# Patient Record
Sex: MALE | Race: WHITE | NOT HISPANIC OR LATINO | Employment: OTHER | URBAN - METROPOLITAN AREA
[De-identification: names, ages, dates, MRNs, and addresses within clinical notes are randomized per-mention and may not be internally consistent; named-entity substitution may affect disease eponyms.]

---

## 2017-10-20 ENCOUNTER — TRANSFERRED RECORDS (OUTPATIENT)
Dept: HEALTH INFORMATION MANAGEMENT | Facility: CLINIC | Age: 72
End: 2017-10-20

## 2017-11-07 ENCOUNTER — PRE VISIT (OUTPATIENT)
Dept: UROLOGY | Facility: CLINIC | Age: 72
End: 2017-11-07

## 2017-11-28 ENCOUNTER — PRE VISIT (OUTPATIENT)
Dept: UROLOGY | Facility: CLINIC | Age: 72
End: 2017-11-28

## 2017-11-28 DIAGNOSIS — N35.919 URETHRAL STRICTURE: Primary | ICD-10-CM

## 2017-11-28 NOTE — TELEPHONE ENCOUNTER
Pt coming in for a urethral stricture consult. He currently has an SP tube. Message sent to schedulers to have RUG/VCUG set up.

## 2017-12-04 ENCOUNTER — OFFICE VISIT (OUTPATIENT)
Dept: UROLOGY | Facility: CLINIC | Age: 72
End: 2017-12-04

## 2017-12-04 VITALS
HEIGHT: 63 IN | HEART RATE: 74 BPM | BODY MASS INDEX: 27.66 KG/M2 | SYSTOLIC BLOOD PRESSURE: 119 MMHG | DIASTOLIC BLOOD PRESSURE: 68 MMHG | WEIGHT: 156.1 LBS

## 2017-12-04 DIAGNOSIS — N35.9 URETHRAL STRICTURE, UNSPECIFIED STRICTURE TYPE: Primary | ICD-10-CM

## 2017-12-04 RX ORDER — ATORVASTATIN CALCIUM 20 MG/1
20 TABLET, FILM COATED ORAL
COMMUNITY
Start: 2017-09-28

## 2017-12-04 RX ORDER — SENNA LEAF EXTRACT 176MG/5ML
15 SYRUP ORAL
COMMUNITY

## 2017-12-04 RX ORDER — OXYBUTYNIN CHLORIDE 10 MG/1
10 TABLET, EXTENDED RELEASE ORAL
COMMUNITY
Start: 2016-12-22

## 2017-12-04 ASSESSMENT — PAIN SCALES - GENERAL: PAINLEVEL: NO PAIN (0)

## 2017-12-04 NOTE — MR AVS SNAPSHOT
After Visit Summary   12/4/2017    Duran Platt    MRN: [id number]           Patient Information     Date Of Birth          1945        Visit Information        Provider Department      12/4/2017 1:30 PM Heriberto Baird MD [facility] and [facility]        Care Instructions    Follow up with Dr. Baird with a cystoscopy.    It was a pleasure meeting with you today.  Thank you for allowing me and my team the privilege of caring for you today.  YOU are the reason we are here, and I truly hope we provided you with the excellent service you deserve.  Please let us know if there is anything else we can do for you so that we can be sure you are leaving completely satisfied with your care experience.              Follow-ups after your visit        Your next 10 appointments already scheduled     Feb 05, 2018  5:30 PM CST   (Arrive by 5:15 PM)   Cystoscopy with Heriberto Baird MD   [facility] and [facility] ([facility] [place])    [address] [place] [address] [phone]              Who to contact     Please call your clinic at [phone] to:    Ask questions about your health    Make or cancel appointments    Discuss your medicines    Learn about your test results    Speak to your doctor   If you have compliments or concerns about an experience at your clinic, or if you wish to file a complaint, please contact [facility] Patient Relations at [phone] or email us at [email or web address]         Additional Information About Your Visit        [employer] Information     [employer] is an electronic gateway that provides easy, online access to your medical records. With [employer], you can request a clinic appointment, read your test results, renew a prescription or communicate with your care team.     To sign up for [employer] visit the website  "at [email or web address]   You will be asked to enter the access code listed below, as well as some personal information. Please follow the directions to create your username and password.     Your access code is: [email or web address]  Expires: 2018  6:31 AM     Your access code will  in 90 days. If you need help or a new code, please contact your [facility] or call [phone] for assistance.        Care EveryWhere ID     This is your Care EveryWhere ID. This could be used by other organizations to access your [place] medical records  [id number]        Your Vitals Were     Pulse Height BMI (Body Mass Index)             74 1.6 m (5' 3\") 27.65 kg/m2          Blood Pressure from Last 3 Encounters:   17 119/68   14 110/70   10/25/12 110/62    Weight from Last 3 Encounters:   17 70.8 kg (156 lb 1.6 oz)   14 73.5 kg (162 lb)   13 70.8 kg (156 lb)              Today, you had the following     No orders found for display         Today's Medication Changes          These changes are accurate as of: 17  2:31 PM.  If you have any questions, ask your nurse or doctor.               Stop taking these medicines if you haven't already. Please contact your care team if you have questions.     carisoprodol 350 MG tablet   Commonly known as:  SOMA   Stopped by:  Heriberto Baird MD           meloxicam 7.5 MG tablet   Commonly known as:  MOBIC   Stopped by:  Heriberto Baird MD           SIMVASTATIN   Stopped by:  Heriberto Baird MD                    Primary Care Provider Office Phone # Fax #    Herberth Montgomery [phone]       [address] [address]        Equal Access to Services     YOHANA VEE : Juan Manuel Lubin, dusty luqtoby, qaelizabeth hutchinsonalaminata stroud. [facility] [phone].    ATENCIÓN: Si louie london " a palacios disposición servicios gratuitos de asistencia lingüística. Jorge milian [phone].    We comply with applicable federal civil rights laws and [place] laws. We do not discriminate on the basis of race, color, national origin, age, disability, sex, sexual orientation, or gender identity.            Thank you!     Thank you for choosing [facility]OLOGY AND [facility] PROST[facility]OLOGIC [facility]  for your care. Our goal is always to provide you with excellent care. Hearing back from our patients is one way we can continue to improve our services. Please take a few minutes to complete the written survey that you may receive in the mail after your visit with us. Thank you!             Your Updated Medication List - Protect others around you: Learn how to safely use, store and throw away your medicines at www.disposemymeds.org.          This list is accurate as of: 12/4/17  2:31 PM.  Always use your most recent med list.                   Brand Name Dispense Instructions for use Diagnosis    ASPIRIN CHILDRENS 81 MG chewable tablet   Generic drug:  aspirin      Take 81 mg by mouth daily.        atorvastatin 20 MG tablet    LIPITOR     Take 20 mg by mouth        Multi-vitamin Tabs tablet      Take 1 tablet by mouth daily.        oxybutynin 10 MG 24 hr tablet    DITROPAN-XL     Take 10 mg by mouth        Senna 176 MG/5ML Syrp      Take 15 mLs by mouth        VITAMIN D-3 PO      Take  by mouth.

## 2017-12-04 NOTE — LETTER
12/4/2017       RE: Duran Platt  [address]  [place] [facility] [place] [address]     Dear Colleague,    Thank you for referring your patient, Duran Platt, to the [facility] UROLOGY AND INST FOR PROSTATE AND UROLOGIC CANCERS at [facility]. Please see a copy of my visit note below.    UROLOGY CONSULT HISTORY & PHYSICAL    Name: Duran Platt    MRN: [id number]   YOB: 1945         CHIEF COMPLAINT:  Urethral stricture    HISTORY OF PRESENT ILLNESS:  Mr. Platt is a 72 year old-year-old man seen in consultation for urethral stricture.  He has previously been seen for this by Dr. Baird at [facility] in October of 2016.  Per clinic note by myself in conjunction with Dr. Kemp recently at [facility] done 4/6/2017, this patient has 'a history of BPH (neg prostate biopsy in 2005 per chart review) and underwent laser TURP at [facility] in 2013. Following this procedure he continued to have problems with decreased urine flow, and he needed an indwelling catheter for a short time, and also was self-cathing for a couple of weeks. 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 Initially he had been feeling well with his urination, but then began experiencing worsening urinary symptoms, and was found to have developed an anterior urethral stricture after his TURP. He has since had an SP tube placed in the OR on 8/23/16 for a long segment urethral stricture.     Since the placement of the SP tube he has been evaluated with Dr. Baird for consideration of reconstructive surgery, but Duran is really just leaning toward having another dilation, and going on the self cathing regimen as this did not bother him much when he was on this in the past. He is also a little concerned about the difficulty in getting his follow up with   Oli, and is happy to just follow up with me.'  Also endorses recent bladder spasms (which previously have occurred with catheter changes and recent VCUG) that have been increasing in frequency. Mentions that these happen early in the morning when he wakes up as well as later in the day. Notes that his catheter is still draining well and that he does not have leakage around the catheter. Denies recent fevers.  He mentions...that he would be interested in reconstruction, but that residual scarring/stricturing is 'a foregone conclusion' based on his outcomes after his previous surgeries.'    Today, his goal is to be free of his suprapubic tube and he notes that he would be interested in reconstruction if this is possible.  Other medical issues include ROBBIE (CPAP @ home).  No known cardiac disease.  Takes a low dose aspirin but no other blood thinners.    REVIEW OF QUESTIONNAIRES:  Questionnaires reviewed. See flowsheet for details.    Past Medical History:   Diagnosis Date     BPH (benign prostatic hyperplasia)        Past Surgical History:   Procedure Laterality Date     APPENDECTOMY       GENITOURINARY SURGERY      prastate bladder neck surgery 1/25/12     ORTHOPEDIC SURGERY      r rotator cuff repair       Current Outpatient Prescriptions   Medication     atorvastatin (LIPITOR) 20 MG tablet     oxybutynin (DITROPAN-XL) 10 MG 24 hr tablet     Senna 176 MG/5ML SYRP     multivitamin, therapeutic with minerals (MULTI-VITAMIN) TABS     Cholecalciferol (VITAMIN D-3 PO)     aspirin (ASPIRIN CHILDRENS) 81 MG chewable tablet     No current facility-administered medications for this visit.        Allergies as of 12/04/2017 - Heber as Reviewed 12/04/2017   Allergen Reaction Noted     Tetracycline  02/13/2009     Tetracyclines Photosensitivity 06/28/2006     Omeprazole Rash 03/21/2012     Omeprazole magnesium Rash 01/27/2012       No family history on file.    Social History     Social History     Marital status:       "Spouse name: N/A     Number of children: N/A     Years of education: N/A     Occupational History     Not on file.     Social History Main Topics     Smoking status: Never Smoker     Smokeless tobacco: Not on file     Alcohol use No     Drug use: No     Sexual activity: Not on file     Other Topics Concern     Not on file     Social History Narrative       REVIEW OF SYSTEMS:   See HPI for pertinent details.  Remainder of 10-point ROS negative.    PHYSICAL EXAM:  General: Well-dressed, well-nourished man in no acute distress  Vitals: /68  Pulse 74  Ht 1.6 m (5' 3\")  Wt 70.8 kg (156 lb 1.6 oz)  BMI 27.65 kg/m2 Estimated body mass index is 27.65 kg/(m^2) as calculated from the following:    Height as of this encounter: 1.6 m (5' 3\").    Weight as of this encounter: 70.8 kg (156 lb 1.6 oz).  Eyes: Anicteric, EOMI  Lungs: No respiratory distress  Heart: Regular rate and rhythm  Abdomen: not obese, soft, nontender, without masses.  Suprapubic tube in place draining randolph urine  Back: Flanks are nontender  : Is circumcised. Meatal stenosis is absent, penile plaques are absent. Skin lesions are absent.  There is not firmness along the course of the entire urethra urethra.  Testes are 10 mL bilaterally without masses. Rectal examination was not performed  Neurologic: Grossly nonfocal    REVIEW OF IMAGING STUDIES:  Retrograde urethrogram and voiding cystourethrogram were ordered but not performed today    RUG/VCUG 3/17/2017 - Reviewed  Impression:   1. Long segment urethral stricture measuring approximately 9 cm from the mid shaft to the membranous urethra, completely obliterated.   2. Bilateral ureteral reflux.     ASSESSMENT/PLAN:  A 72 year old-year-old gentleman with urethral stricture refractory to minimally invasive management.  He has been managed by prior urologist(s) and is referred to our center for advanced treatment options.    The RUG/VCUG dated March 2017 shows significant progression of the length " of the urethral stricture compared to imaging we had done in October 2016. The patient states that he had significant pain during the March exam and that perhaps the quality of the exam was affected by this. Given the side like to proceed with cystoscopy in the office to confirm the extent of disease. We will plan to place a flexible cystoscope through the suprapubic cystostomy tract and then a pediatric flexible scope through the penis if the adult flexible scope passed through the penis. If we find only among membranous stricture and then he could be a good candidate for a ventral onlay buccal mucosa graft. If there is more extensive disease then he might be a candidate for perineal urethrostomy if there is enough proximal urethra.     Patient was seen and examined with Dr. Baird    --    Rohit Montgomery MD  Urology Resident  pgr: [phone]    1:31 PM, 12/4/2017    =======================================================  As the attending surgeon I, Heriberto Baird, interviewed and examined the patient. The plan was developed between me and the patient. My findings and plan are as stated by the resident.    Heriberto Baird MD

## 2017-12-04 NOTE — PATIENT INSTRUCTIONS
Follow up with Dr. Baird with a cystoscopy.    It was a pleasure meeting with you today.  Thank you for allowing me and my team the privilege of caring for you today.  YOU are the reason we are here, and I truly hope we provided you with the excellent service you deserve.  Please let us know if there is anything else we can do for you so that we can be sure you are leaving completely satisfied with your care experience.

## 2017-12-04 NOTE — PROGRESS NOTES
UROLOGY CONSULT HISTORY & PHYSICAL    Name: Duran Platt    MRN: [id number]   YOB: 1945         CHIEF COMPLAINT:  Urethral stricture    HISTORY OF PRESENT ILLNESS:  Mr. Platt is a 72 year old-year-old man seen in consultation for urethral stricture.  He has previously been seen for this by Dr. Baird at [facility] in October of 2016.  Per clinic note by myself in conjunction with Dr. Kemp recently at [facility] done 4/6/2017, this patient has 'a history of BPH (neg prostate biopsy in 2005 per chart review) and underwent laser TURP at [facility] in 2013. Following this procedure he continued to have problems with decreased urine flow, and he needed an indwelling catheter for a short time, and also was self-cathing for a couple of weeks. 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 Initially he had been feeling well with his urination, but then began experiencing worsening urinary symptoms, and was found to have developed an anterior urethral stricture after his TURP. He has since had an SP tube placed in the OR on 8/23/16 for a long segment urethral stricture.     Since the placement of the SP tube he has been evaluated with Dr. Baird for consideration of reconstructive surgery, but Duran is really just leaning toward having another dilation, and going on the self cathing regimen as this did not bother him much when he was on this in the past. He is also a little concerned about the difficulty in getting his follow up with Dr. Baird, and is happy to just follow up with me.'  Also endorses recent bladder spasms (which previously have occurred with catheter changes and recent VCUG) that have been increasing in frequency. Mentions that these happen early in the morning when he wakes up as well as later in the day. Notes that his catheter is  still draining well and that he does not have leakage around the catheter. Denies recent fevers.  He mentions...that he would be interested in reconstruction, but that residual scarring/stricturing is 'a foregone conclusion' based on his outcomes after his previous surgeries.'    Today, his goal is to be free of his suprapubic tube and he notes that he would be interested in reconstruction if this is possible.  Other medical issues include ROBBIE (CPAP @ home).  No known cardiac disease.  Takes a low dose aspirin but no other blood thinners.    REVIEW OF QUESTIONNAIRES:  Questionnaires reviewed. See flowsheet for details.    Past Medical History:   Diagnosis Date     BPH (benign prostatic hyperplasia)        Past Surgical History:   Procedure Laterality Date     APPENDECTOMY       GENITOURINARY SURGERY      prastate bladder neck surgery 1/25/12     ORTHOPEDIC SURGERY      r rotator cuff repair       Current Outpatient Prescriptions   Medication     atorvastatin (LIPITOR) 20 MG tablet     oxybutynin (DITROPAN-XL) 10 MG 24 hr tablet     Senna 176 MG/5ML SYRP     multivitamin, therapeutic with minerals (MULTI-VITAMIN) TABS     Cholecalciferol (VITAMIN D-3 PO)     aspirin (ASPIRIN CHILDRENS) 81 MG chewable tablet     No current facility-administered medications for this visit.        Allergies as of 12/04/2017 - Heber as Reviewed 12/04/2017   Allergen Reaction Noted     Tetracycline  02/13/2009     Tetracyclines Photosensitivity 06/28/2006     Omeprazole Rash 03/21/2012     Omeprazole magnesium Rash 01/27/2012       No family history on file.    Social History     Social History     Marital status:      Spouse name: N/A     Number of children: N/A     Years of education: N/A     Occupational History     Not on file.     Social History Main Topics     Smoking status: Never Smoker     Smokeless tobacco: Not on file     Alcohol use No     Drug use: No     Sexual activity: Not on file     Other Topics Concern     Not on  "file     Social History Narrative       REVIEW OF SYSTEMS:   See HPI for pertinent details.  Remainder of 10-point ROS negative.    PHYSICAL EXAM:  General: Well-dressed, well-nourished man in no acute distress  Vitals: /68  Pulse 74  Ht 1.6 m (5' 3\")  Wt 70.8 kg (156 lb 1.6 oz)  BMI 27.65 kg/m2 Estimated body mass index is 27.65 kg/(m^2) as calculated from the following:    Height as of this encounter: 1.6 m (5' 3\").    Weight as of this encounter: 70.8 kg (156 lb 1.6 oz).  Eyes: Anicteric, EOMI  Lungs: No respiratory distress  Heart: Regular rate and rhythm  Abdomen: not obese, soft, nontender, without masses.  Suprapubic tube in place draining randolph urine  Back: Flanks are nontender  : Is circumcised. Meatal stenosis is absent, penile plaques are absent. Skin lesions are absent.  There is not firmness along the course of the entire urethra urethra.  Testes are 10 mL bilaterally without masses. Rectal examination was not performed  Neurologic: Grossly nonfocal    REVIEW OF IMAGING STUDIES:  Retrograde urethrogram and voiding cystourethrogram were ordered but not performed today    RUG/VCUG 3/17/2017 - Reviewed  Impression:   1. Long segment urethral stricture measuring approximately 9 cm from the mid shaft to the membranous urethra, completely obliterated.   2. Bilateral ureteral reflux.     ASSESSMENT/PLAN:  A 72 year old-year-old gentleman with urethral stricture refractory to minimally invasive management.  He has been managed by prior urologist(s) and is referred to our center for advanced treatment options.    The RUG/VCUG dated March 2017 shows significant progression of the length of the urethral stricture compared to imaging we had done in October 2016. The patient states that he had significant pain during the March exam and that perhaps the quality of the exam was affected by this. Given the side like to proceed with cystoscopy in the office to confirm the extent of disease. We will plan to " place a flexible cystoscope through the suprapubic cystostomy tract and then a pediatric flexible scope through the penis if the adult flexible scope passed through the penis. If we find only among membranous stricture and then he could be a good candidate for a ventral onlay buccal mucosa graft. If there is more extensive disease then he might be a candidate for perineal urethrostomy if there is enough proximal urethra.     Patient was seen and examined with Dr. Baird    --    Rohit Montgomery MD  Urology Resident  pgr: [phone]    1:31 PM, 12/4/2017    =======================================================  As the attending surgeon I, Heriberto Baird, interviewed and examined the patient. The plan was developed between me and the patient. My findings and plan are as stated by the resident.    Heriberto Baird MD

## 2017-12-04 NOTE — NURSING NOTE
"Chief Complaint   Patient presents with     Consult     Pt coming in for a urethral stricture consult. He currently has an SP tube.        Blood pressure 119/68, pulse 74, height 1.6 m (5' 3\"), weight 70.8 kg (156 lb 1.6 oz). Body mass index is 27.65 kg/(m^2).    Patient Active Problem List   Diagnosis     Ingrowing nail       Allergies   Allergen Reactions     Tetracycline      Pt is very sensitive to sun with this med.     Tetracyclines Photosensitivity     Omeprazole Rash     Omeprazole Magnesium Rash       Current Outpatient Prescriptions   Medication Sig Dispense Refill     atorvastatin (LIPITOR) 20 MG tablet Take 20 mg by mouth       oxybutynin (DITROPAN-XL) 10 MG 24 hr tablet Take 10 mg by mouth       Senna 176 MG/5ML SYRP Take 15 mLs by mouth       multivitamin, therapeutic with minerals (MULTI-VITAMIN) TABS Take 1 tablet by mouth daily.       Cholecalciferol (VITAMIN D-3 PO) Take  by mouth.       aspirin (ASPIRIN CHILDRENS) 81 MG chewable tablet Take 81 mg by mouth daily.         Social History   Substance Use Topics     Smoking status: Never Smoker     Smokeless tobacco: Not on file     Alcohol use No       NIKITA Thomas  12/4/2017  1:28 PM       "

## 2018-01-29 ENCOUNTER — PRE VISIT (OUTPATIENT)
Dept: UROLOGY | Facility: CLINIC | Age: 73
End: 2018-01-29

## 2018-01-29 NOTE — TELEPHONE ENCOUNTER
Pt coming in for a cystoscopy to evaluate his urethral stricture. Chart reviewed and we will need to have the peds and adult scope ready. No need for a call.

## 2018-02-05 ENCOUNTER — OFFICE VISIT (OUTPATIENT)
Dept: UROLOGY | Facility: CLINIC | Age: 73
End: 2018-02-05
Payer: COMMERCIAL

## 2018-02-05 VITALS
SYSTOLIC BLOOD PRESSURE: 137 MMHG | WEIGHT: 155.7 LBS | BODY MASS INDEX: 27.59 KG/M2 | HEART RATE: 68 BPM | DIASTOLIC BLOOD PRESSURE: 73 MMHG | HEIGHT: 63 IN

## 2018-02-05 DIAGNOSIS — N99.114 POSTPROCEDURAL STRICTURE OF ANTERIOR URETHRA: Primary | ICD-10-CM

## 2018-02-05 RX ORDER — SULFAMETHOXAZOLE/TRIMETHOPRIM 800-160 MG
TABLET ORAL
COMMUNITY
Start: 2017-04-10 | End: 2018-02-22

## 2018-02-05 RX ORDER — SIMVASTATIN 20 MG
TABLET ORAL
COMMUNITY
Start: 2017-10-11 | End: 2018-02-22

## 2018-02-05 RX ORDER — TAMSULOSIN HYDROCHLORIDE 0.4 MG/1
CAPSULE ORAL
COMMUNITY
Start: 2017-03-16

## 2018-02-05 ASSESSMENT — PAIN SCALES - GENERAL: PAINLEVEL: NO PAIN (0)

## 2018-02-05 NOTE — PROGRESS NOTES
Cystourethroscopy    PRE-PROCEDURE DIAGNOSIS: Urethral Stricture, History of BPH  POST-PROCEDURE DIAGNOSIS: Urethral Stricture, Regrowth of BPH  PROCEDURE: Cystourethroscopy (through urethral maetus and through suprapubic tract)    HISTORY: Duran Platt is a 72 year old man. He has a history of BPH s/p TURP at [place] in 2013. 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 He then noted worsening urinary symptoms. He had an SPT placed in August 2016 for a long segment urethral stricture. RUG at that time at [facility] demonstrated a long urethral stricture. Proximal bulb was spared per the RUG in the OR in 2016. He is living with SPT (18Fr) since that time. Returns today for cystoscopy to delineate anatomy.    DESCRIPTION OF PROCEDURE:  After informed consent was obtained, the patient was brought to the procedure room where he was placed in the supine position with all pressure points well padded.  He was prepped and draped in a sterile fashion. A flexible pediatric cystoscope was introduced through a well-lubricated urethra.  At the penoscrotal junction, there was a VERY tight pinpoint urethral stricture which did not allow pediatric scope passage.The anterior urethra up to this point was normal.    The suprapubic tube was removed, and the patient's abdomen was re-prepped. An adult flexible cystoscope was inserted into the suprapubic tract. The bladder was normal without any tumors or stones. At the bladder neck, there was a large median lobe of prostatic tissue which was obstructing the bladder neck. The scope was unable to be passed down the bladder neck due to the large size and configuration of the median lobe. The scope was withdrawn. An 18 Fr new catheter was placed as a suprapubic tube. 10cc placed in the balloon.    ASSESSMENT AND PLAN:  We discussed with the patient that he has a few options, but his case is challenging given his BPH, previous TURP and long severe urethral  stricture. He could attempt to regain full urethral patency by performing a long buccal urethroplasty in combination with a bladder outlet procedure. Another option would be work towards a perineal urethrostomy (would likely need a bladder outlet procedure as well, and we have no assessed proximal bulbar urethra yet). Another option would be keep the suprapubic tube indefinitely (live the way he is). Or finally, we could abandon the outlet and create a catheterizable channel for him (with or without augmentation).    As the attending surgeon I, Heriberto Baird, was present and scrubbed throughout the procedure.

## 2018-02-05 NOTE — MR AVS SNAPSHOT
"              After Visit Summary   2/5/2018    Duran Platt    MRN: [id number]           Patient Information     Date Of Birth          1945        Visit Information        Provider Department      2/5/2018 5:30 PM Heriberto Baird MD [facility] Urology and [facility] for Prostate and Urologic Cancers        Today's Diagnoses     Postprocedural stricture of anterior urethra    -  1      Care Instructions      AFTER YOUR CYSTOSCOPY        You have just completed a cystoscopy, or \"cysto\", which allowed your physician to learn more about your bladder (or to remove a stent placed after surgery). We suggest that you continue to avoid caffeine, fruit juice, and alcohol for the next 24 hours, however, you are encouraged to return to your normal activities.         A few things that are considered normal after your cystoscopy:     * Small amount of bleeding (or spotting) that clears within the next 24 hours     * Slight burning sensation with urination     * Sensation to of needing to avoid more frequently     * The feeling of \"air\" in your urine     * Mild discomfort that is relieved with Tylenol        Please contact our office promptly if you:     * Develop a fever above 101 degrees     * Are unable to urinate     * Develop bright red blood that does not stop     * Severe pain or swelling         Please contact our office with any concerns or questions @[facility].          Follow-ups after your visit        Follow-up notes from your care team     Return in 12 months (on 2/5/2019).      Who to contact     Please call your clinic at [phone] to:    Ask questions about your health    Make or cancel appointments    Discuss your medicines    Learn about your test results    Speak to your doctor   If you have compliments or concerns about an experience at your clinic, or if you wish to file a complaint, please contact [facility] Patient Relations at [phone] or email us at " "[email or web address]         Additional Information About Your Visit        [employer] Information     realSociablet is an electronic gateway that provides easy, online access to your medical records. With [employer], you can request a clinic appointment, read your test results, renew a prescription or communicate with your care team.     To sign up for [employer] visit the website at [email or web address]   You will be asked to enter the access code listed below, as well as some personal information. Please follow the directions to create your username and password.     Your access code is: [email or web address]  Expires: 2018  6:31 AM     Your access code will  in 90 days. If you need help or a new code, please contact your [facility] or call [phone] for assistance.        Care EveryWhere ID     This is your Care EveryWhere ID. This could be used by other organizations to access your [place] medical records  [id number]        Your Vitals Were     Pulse Height BMI (Body Mass Index)             68 1.6 m (5' 3\") 27.58 kg/m2          Blood Pressure from Last 3 Encounters:   18 137/73   17 119/68   14 110/70    Weight from Last 3 Encounters:   18 70.6 kg (155 lb 11.2 oz)   17 70.8 kg (156 lb 1.6 oz)   14 73.5 kg (162 lb)              We Performed the Following     CYSTOURETHROSCOPY        Primary Care Provider Office Phone # Fax #    Herberth Montgomery [phone]       [address] [address]        Equal Access to Services     [facility] : Hadii cassius Lubin, dusty milian, qaelizabeth hutchinsonalaminata stroud . So [facility] [phone].    ATENCIÓN: Si habla español, tiene a palacios disposición servicios gratuitos de asistencia lingüística. Llame al [phone].    We comply with applicable federal civil rights laws and [place] laws. We " do not discriminate on the basis of race, color, national origin, age, disability, sex, sexual orientation, or gender identity.            Thank you!     Thank you for choosing [facility] UROLOGY AND [facility] FOR PROSTATE AND UROLOGIC CANCERS  for your care. Our goal is always to provide you with excellent care. Hearing back from our patients is one way we can continue to improve our services. Please take a few minutes to complete the written survey that you may receive in the mail after your visit with us. Thank you!             Your Updated Medication List - Protect others around you: Learn how to safely use, store and throw away your medicines at www.disposemymeds.org.          This list is accurate as of 2/5/18 11:59 PM.  Always use your most recent med list.                   Brand Name Dispense Instructions for use Diagnosis    ASPIRIN CHILDRENS 81 MG chewable tablet   Generic drug:  aspirin      Take 81 mg by mouth daily.        atorvastatin 20 MG tablet    LIPITOR     Take 20 mg by mouth        oxybutynin 10 MG 24 hr tablet    DITROPAN-XL     Take 10 mg by mouth        PEG 3350/Electrolytes 240 G Solr           Senna 176 MG/5ML Syrp      Take 15 mLs by mouth        simvastatin 20 MG tablet    ZOCOR          sulfamethoxazole-trimethoprim 800-160 MG per tablet    BACTRIM DS/SEPTRA DS          tamsulosin 0.4 MG capsule    FLOMAX          VITAMIN D-3 PO      Take  by mouth.

## 2018-02-05 NOTE — NURSING NOTE
Invasive Procedure Safety Checklist:    Procedure:     Action: Complete sections and checkboxes as appropriate.    Pre-procedure:  1. Patient ID Verified with 2 identifiers (Madelyn and  or MRN) : YES    2. Procedure and site verified with patient/designee (when able) : YES    3. Accurate consent documentation in medical record : YES    4. H&P (or appropriate assessment) documented in medical record : YES  H&P must be up to 30 days prior to procedure an updated within 24 hours of                 Procedure as applicable.     5. Relevant diagnostic and radiology test results appropriately labeled and displayed as applicable : YES    6. Blood products, implants, devices, and/or special equipment available for the procedure as applicable : YES    7. Procedure site(s) marked with provider initials [Exclusions: N/A] : NO    8. Marking not required. Reason : Yes  Procedure does not require site marking    Time Out:     Time-Out performed immediately prior to starting procedure, including verbal and active participation of all team members addressing: YES    1. Correct patient identity.  2. Confirmed that the correct side and site are marked.  3. An accurate procedure to be done.  4. Agreement on the procedure to be done.  5. Correct patient position.  6. Relevant images and results are properly labeled and appropriately displayed.  7. The need to administer antibiotics or fluids for irrigation purposes during the procedure as applicable.  8. Safety precautions based on patient history or medication use.    During Procedure: Verification of correct person, site, and procedure occurs any time the responsibility for care of the patient is transferred to another member of the care team.

## 2018-02-05 NOTE — LETTER
2/5/2018       RE: Duran Platt  [address]  [place] [address]     Dear Colleague,    Thank you for referring your patient, Duran Platt, to the [facility] UROLOGY AND INST FOR PROSTATE AND UROLOGIC CANCERS at [facility]. Please see a copy of my visit note below.    Cystourethroscopy    PRE-PROCEDURE DIAGNOSIS: Urethral Stricture, History of BPH  POST-PROCEDURE DIAGNOSIS: Urethral Stricture, Regrowth of BPH  PROCEDURE: Cystourethroscopy (through urethral maetus and through suprapubic tract)    HISTORY: Duran Platt is a 72 year old man. He has a history of BPH s/p TURP at [place] in 2013. 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 He then noted worsening urinary symptoms. He had an SPT placed in August 2016 for a long segment urethral stricture. RUG at that time at [facility] demonstrated a long urethral stricture. Proximal bulb was spared per the RUG in the OR in 2016. He is living with SPT (18Fr) since that time. Returns today for cystoscopy to delineate anatomy.    DESCRIPTION OF PROCEDURE:  After informed consent was obtained, the patient was brought to the procedure room where he was placed in the supine position with all pressure points well padded.  He was prepped and draped in a sterile fashion. A flexible pediatric cystoscope was introduced through a well-lubricated urethra.  At the penoscrotal junction, there was a VERY tight pinpoint urethral stricture which did not allow pediatric scope passage.The anterior urethra up to this point was normal.    The suprapubic tube was removed, and the patient's abdomen was re-prepped. An adult flexible cystoscope was inserted into the suprapubic tract. The bladder was normal without any tumors or stones. At the bladder neck, there was a large median lobe of prostatic tissue which was obstructing the bladder neck. The scope was unable to be passed down the bladder neck due to the  large size and configuration of the median lobe. The scope was withdrawn. An 18 Fr new catheter was placed as a suprapubic tube. 10cc placed in the balloon.    ASSESSMENT AND PLAN:  We discussed with the patient that he has a few options, but his case is challenging given his BPH, previous TURP and long severe urethral stricture. He could attempt to regain full urethral patency by performing a long buccal urethroplasty in combination with a bladder outlet procedure. Another option would be work towards a perineal urethrostomy (would likely need a bladder outlet procedure as well, and we have no assessed proximal bulbar urethra yet). Another option would be keep the suprapubic tube indefinitely (live the way he is). Or finally, we could abandon the outlet and create a catheterizable channel for him (with or without augmentation).    As the attending surgeon I, Heriberto Baird, was present and scrubbed throughout the procedure.         Again, thank you for allowing me to participate in the care of your patient.      Sincerely,    Heriberto Baird MD

## 2018-02-05 NOTE — NURSING NOTE
Chief Complaint   Patient presents with     Cystoscopy     Pt coming in for a cystoscopy to evaluate his urethral stricture.      Marilu Eisenberg

## 2018-02-22 ENCOUNTER — OFFICE VISIT (OUTPATIENT)
Dept: URGENT CARE | Facility: URGENT CARE | Age: 73
End: 2018-02-22
Payer: COMMERCIAL

## 2018-02-22 VITALS
SYSTOLIC BLOOD PRESSURE: 122 MMHG | BODY MASS INDEX: 27.5 KG/M2 | DIASTOLIC BLOOD PRESSURE: 69 MMHG | HEART RATE: 71 BPM | TEMPERATURE: 98.2 F | WEIGHT: 155.25 LBS

## 2018-02-22 DIAGNOSIS — H11.31 SUBCONJUNCTIVAL HEMORRHAGE, RIGHT: Primary | ICD-10-CM

## 2018-02-22 PROCEDURE — 99213 OFFICE O/P EST LOW 20 MIN: CPT | Performed by: FAMILY MEDICINE

## 2018-02-22 NOTE — PATIENT INSTRUCTIONS
Subconjunctival Hemorrhage    A subconjunctival hemorrhage is a result of a broken blood vessel in the white part of the eye. It is usually painless and may be caused by coughing, sneezing, or vomiting. An injury to the eye can cause this. It can also be a sign of high blood pressure (hypertension) or a bleeding disorder.  This can look frightening. But the presence of the blood is not serious. The blood will be reabsorbed without treatment within 2 to 3 weeks.  Home care  You may continue your usual activities.  Follow-up care  Follow up with your healthcare provider, or as advised.  When to seek medical advice  Contact your healthcare provider right away if any of these occur:    Pain in the eye    Change in vision    The blood does not go away within 3 weeks    Increasing redness or swelling of the eye    Severe headache or dizziness    Signs of bruising or bleeding from other parts of your body  Date Last Reviewed: 8/1/2017 2000-2017 [employer]. [address] [place] [address], [place] [address]. All rights reserved. This information is not intended as a substitute for professional medical care. Always follow your healthcare professional's instructions.

## 2018-02-22 NOTE — MR AVS SNAPSHOT
After Visit Summary   2/22/2018    Duran Platt    MRN: [id number]           Patient Information     Date Of Birth          1945        Visit Information        Provider Department      2/22/2018 11:15 AM Heber Wakefield DO [facility]        Today's Diagnoses     Subconjunctival hemorrhage, right    -  1      Care Instructions      Subconjunctival Hemorrhage    A subconjunctival hemorrhage is a result of a broken blood vessel in the white part of the eye. It is usually painless and may be caused by coughing, sneezing, or vomiting. An injury to the eye can cause this. It can also be a sign of high blood pressure (hypertension) or a bleeding disorder.  This can look frightening. But the presence of the blood is not serious. The blood will be reabsorbed without treatment within 2 to 3 weeks.  Home care  You may continue your usual activities.  Follow-up care  Follow up with your healthcare provider, or as advised.  When to seek medical advice  Contact your healthcare provider right away if any of these occur:    Pain in the eye    Change in vision    The blood does not go away within 3 weeks    Increasing redness or swelling of the eye    Severe headache or dizziness    Signs of bruising or bleeding from other parts of your body  Date Last Reviewed: 8/1/2017 2000-2017 The [employer]. [address] [place] [address]. All rights reserved. This information is not intended as a substitute for professional medical care. Always follow your healthcare professional's instructions.                Follow-ups after your visit        Who to contact     If you have questions or need follow up information about today's clinic visit or your schedule please contact [facility] directly at [phone].  Normal or non-critical lab and imaging results will be communicated to you by MyChart, letter or phone within 4 business days  "after the clinic has received the results. If you do not hear from us within 7 days, please contact the clinic through [employer] or phone. If you have a critical or abnormal lab result, we will notify you by phone as soon as possible.  Submit refill requests through [employer] or call your pharmacy and they will forward the refill request to us. Please allow 3 business days for your refill to be completed.          Additional Information About Your Visit        [employer] Information     [employer] lets you send messages to your doctor, view your test results, renew your prescriptions, schedule appointments and more. To sign up, go to [email or web address] . Click on \"Log in\" on the left side of the screen, which will take you to the Welcome page. Then click on \"Sign up Now\" on the right side of the page.     You will be asked to enter the access code listed below, as well as some personal information. Please follow the directions to create your username and password.     Your access code is: [email or web address]  Expires: 2018 12:31 PM     Your access code will  in 90 days. If you need help or a new code, please call your [place] clinic or [phone].        Care EveryWhere ID     This is your Care EveryWhere ID. This could be used by other organizations to access your [place] medical records  [id number]        Your Vitals Were     Pulse Temperature BMI (Body Mass Index)             71 98.2  F (36.8  C) (Oral) 27.5 kg/m2          Blood Pressure from Last 3 Encounters:   18 122/69   18 137/73   17 119/68    Weight from Last 3 Encounters:   18 155 lb 4 oz (70.4 kg)   18 155 lb 11.2 oz (70.6 kg)   17 156 lb 1.6 oz (70.8 kg)              Today, you had the following     No orders found for display         Today's Medication Changes          These changes are accurate as of 18 12:31 PM.  If you have any questions, ask your nurse or doctor.               Stop taking these " medicines if you haven't already. Please contact your care team if you have questions.     simvastatin 20 MG tablet   Commonly known as:  ZOCOR   Stopped by:  Heber Wakefield DO           sulfamethoxazole-trimethoprim 800-160 MG per tablet   Commonly known as:  BACTRIM DS/SEPTRA DS   Stopped by:  Heber Wakefield DO                    Primary Care Provider Office Phone # Fax #    Herberth Montgomery [phone]       [place] [address]        Equal Access to Services     [facility] : Hadii aad ku hadasho Soomaali, waaxda luqadaha, qaybta kaalmada adeegyada, waxay idiin hayaan adeeg kharash layi . So [facility] [phone].    ATENCIÓN: Si habla español, tiene a palacios disposición servicios gratuitos de asistencia lingüística. Jorge al [phone].    We comply with applicable federal civil rights laws and [place] laws. We do not discriminate on the basis of race, color, national origin, age, disability, sex, sexual orientation, or gender identity.            Thank you!     Thank you for choosing [place] [facility]RGENT [facility]  for your care. Our goal is always to provide you with excellent care. Hearing back from our patients is one way we can continue to improve our services. Please take a few minutes to complete the written survey that you may receive in the mail after your visit with us. Thank you!             Your Updated Medication List - Protect others around you: Learn how to safely use, store and throw away your medicines at www.disposemymeds.org.          This list is accurate as of 2/22/18 12:31 PM.  Always use your most recent med list.                   Brand Name Dispense Instructions for use Diagnosis    ASPIRIN CHILDRENS 81 MG chewable tablet   Generic drug:  aspirin      Take 81 mg by mouth daily.        atorvastatin 20 MG tablet    LIPITOR     Take 20 mg by mouth        oxybutynin 10 MG 24 hr tablet    DITROPAN-XL     Take 10  mg by mouth        PEG 3350/Electrolytes 240 G Solr           Senna 176 MG/5ML Syrp      Take 15 mLs by mouth        tamsulosin 0.4 MG capsule    FLOMAX          VITAMIN D-3 PO      Take  by mouth.

## 2018-02-22 NOTE — PROGRESS NOTES
SUBJECTIVE:Chief Complaint:   Chief Complaint   Patient presents with     Eye Problem     scratch of right eye x one week.       History of Present Illness: Duran Platt is a 72 year old male who presents complaining of right eye blood lateral eye for 2 days.  Onset/timing: gradual.   Associated Signs and Symptoms: none   Treatment measures tried include: none   Contact wearer : No    Past Medical History:   Diagnosis Date     BPH (benign prostatic hyperplasia)      Allergies   Allergen Reactions     Tetracycline      Pt is very sensitive to sun with this med.     Tetracyclines Photosensitivity     Omeprazole Rash     Omeprazole Magnesium Rash     Social History   Substance Use Topics     Smoking status: Never Smoker     Smokeless tobacco: Never Used     Alcohol use No       ROS:  negative for photophobia, pain, vision change    OBJECTIVE:  /69  Pulse 71  Temp 98.2  F (36.8  C) (Oral)  Wt 155 lb 4 oz (70.4 kg)  BMI 27.5 kg/m2   General: no acute distress  Eye exam: left eye normal lid, conjunctiva, cornea, pupil and fundus, right eye abnormal findings: conjunctiva rt lateretal eye with slight redness with erythema, discharge and matting noted.  ALIZE, EOMI, fundi normal, corneas normal, no foreign bodies, visual acuity normal both eyes, no periorbital cellulitis      ICD-10-CM    1. Subconjunctival hemorrhage, right H11.31      Eye lubricants  Return to clinic if no improvement or worsening condition.

## 2018-03-14 ENCOUNTER — TELEPHONE (OUTPATIENT)
Dept: UROLOGY | Facility: CLINIC | Age: 73
End: 2018-03-14

## 2018-03-14 NOTE — TELEPHONE ENCOUNTER
Patient called and trying to stretch his bladder out by plugging the catheter for a couple of hours  So far his capacity is 100cc trying to get it larger before deciding on surgery. Genoveva Cotto LPN Staff Nurse

## 2019-04-30 ENCOUNTER — ANCILLARY PROCEDURE (OUTPATIENT)
Dept: GENERAL RADIOLOGY | Facility: CLINIC | Age: 74
End: 2019-04-30
Attending: FAMILY MEDICINE
Payer: COMMERCIAL

## 2019-04-30 ENCOUNTER — OFFICE VISIT (OUTPATIENT)
Dept: ORTHOPEDICS | Facility: CLINIC | Age: 74
End: 2019-04-30
Payer: COMMERCIAL

## 2019-04-30 VITALS — BODY MASS INDEX: 27.46 KG/M2 | WEIGHT: 155 LBS | HEIGHT: 63 IN

## 2019-04-30 DIAGNOSIS — M25.571 PAIN IN JOINT INVOLVING RIGHT ANKLE AND FOOT: ICD-10-CM

## 2019-04-30 DIAGNOSIS — M25.571 PAIN IN JOINT INVOLVING RIGHT ANKLE AND FOOT: Primary | ICD-10-CM

## 2019-04-30 RX ORDER — NAPROXEN 500 MG/1
500 TABLET ORAL 2 TIMES DAILY WITH MEALS
Qty: 20 TABLET | Refills: 0 | Status: SHIPPED | OUTPATIENT
Start: 2019-04-30

## 2019-04-30 ASSESSMENT — MIFFLIN-ST. JEOR: SCORE: 1348.21

## 2019-04-30 ASSESSMENT — PAIN SCALES - GENERAL: PAINLEVEL: MODERATE PAIN (4)

## 2019-04-30 NOTE — PROGRESS NOTES
CHIEF COMPLAINT:  Pain of the Right Ankle and Ankle Pain (right ankle pain x weeks)       HISTORY OF PRESENT ILLNESS  Mr. Platt is a pleasant 73 year old year old male who presents to clinic today with pain of his right ankle over the past two weeks.  Duran explains that pain began without acute inciting event about two weeks ago. Pain is laterally about the ankle.  He states the pain is aching in nature.  It is worse with ambulation and negotiating stairs.  Improved with rest.  Treatment to date is included only rest.  Denies numbness and tingling lower extremity.  No history of ankle sprains.  He does note recent history of Achilles tendinitis.     At baseline he does have mild swelling of bilateral ankles.      Additional history: as documented    MEDICAL HISTORY  Patient Active Problem List   Diagnosis     Ingrowing nail       Current Outpatient Medications   Medication Sig Dispense Refill     aspirin (ASPIRIN CHILDRENS) 81 MG chewable tablet Take 81 mg by mouth daily.       atorvastatin (LIPITOR) 20 MG tablet Take 20 mg by mouth       Cholecalciferol (VITAMIN D-3 PO) Take  by mouth.       naproxen (NAPROSYN) 500 MG tablet Take 1 tablet (500 mg) by mouth 2 times daily (with meals) 20 tablet 0     oxybutynin (DITROPAN-XL) 10 MG 24 hr tablet Take 10 mg by mouth       PEG 3350-KCl-NaBcb-NaCl-NaSulf (PEG 3350/ELECTROLYTES) 240 G SOLR        Senna 176 MG/5ML SYRP Take 15 mLs by mouth       tamsulosin (FLOMAX) 0.4 MG capsule          Allergies   Allergen Reactions     Tetracycline      Pt is very sensitive to sun with this med.     Tetracyclines Photosensitivity     Omeprazole Rash     Omeprazole Magnesium Rash       No family history on file.    Additional medical/Social/Surgical histories reviewed in [facility] and updated as appropriate.     REVIEW OF SYSTEMS (4/30/2019)  CONSTITUTIONAL: Denies fever and weight loss  EYES: Denies acute vision changes  ENT: Denies hearing changes or difficulty swallowing  CARDIAC:  "Denies chest pain or edema  RESPIRATORY: Denies dyspnea, cough or wheeze  GASTROINTESTINAL: Denies abdominal pain, nausea, vomiting  MUSCULOSKELETAL: See HPI  SKIN: Denies any recent rash or lesion  NEUROLOGICAL: Denies numbness or focal weakness  PSYCHIATRIC: No  ENDOCRINE: Diagnosis of diabetes:No  HEMATOLOGY: Denies episodes of easy bleeding      PHYSICAL EXAM  Ht 1.6 m (5' 3\")   Wt 70.3 kg (155 lb)   BMI 27.46 kg/m      General  - normal appearance, in no obvious distress  CV  - normal pulses at posterior tib and dorsalis pedis  Pulm  - normal respiratory pattern, non-labored  Musculoskeletal - Right ankle  - stance: normal gait without limp  - inspection: Mild soft tissue swelling at the lateral foot ankle junction.,  normal bone and joint alignment, no obvious deformity  - palpation: Tenderness to palpation along the peroneal tendons at the region of lateral malleolus and slightly distal along the peroneal tendon.  Nontender at the base of fifth metatarsal, nontender at, malleoli and tarsal bones non-tender  - ROM: normal dorsiflexion, plantar flexion, inversion, eversion, not painful  - strength: 5/5 in all planes, painful resisted eversion.  Painful resisted inversion.  - special tests:  (-) anterior drawer  (-) talar tilt  (-) Tinel's  (-) squeeze test  Neuro  - no sensory or motor deficit, grossly normal coordination, normal muscle tone  Skin  - no ecchymosis, erythema, warmth, or induration, no obvious rash  Psych  - interactive, appropriate, normal mood and affect    IMAGING : X-ray ankle 3 view right. Final results and radiologist's interpretation, available in the [facility] health record. Images were reviewed with the patient/family members in the office today. My personal interpretation of the performed imaging is no acute osseous ab normalities or significant degenerative change.  Mortise intact.  No significant anterior posterior talar spurring.     ASSESSMENT & PLAN  Mr. Platt is a 73 year old year " old male who presents to clinic today with insidious onset of right ankle pain over the past 2 weeks.  Clinical examination is concerning for peroneal tendinopathy.  This point we discussed means for anti-inflammation, use of an ankle brace for support of these tendinous structures.  He will also follow a home exercise program if he does not achieve relief over the next 3 to 4 weeks, he should follow-up.    Diagnosis: Acute pain of right ankle, peroneal tendinitis of right ankle.    It was a pleasure seeing Duran today.    Dwight Mcclure DO, [facility]  Primary Care Sports Medicine

## 2019-04-30 NOTE — LETTER
4/30/2019       RE: Duran Platt  [address]  [facility] [address]     Dear Colleague,    Thank you for referring your patient, Duran Platt, to the [facility]IN[facility] at [facility]. Please see a copy of my visit note below.          SPORTS & ORTHOPEDIC WALK-IN VISIT 4/30/2019    Primary Care Physician: Dr. rodríguez    Right ankle pain x 2 weeks. No injury known. Has been dealing with achilles pain x several months, so not sure what's going on now. Ankle pain has been on and off    Reason for visit:     What part of your body is injured / painful?  right ankle    What caused the injury /pain? No inciting event     How long ago did your injury occur or pain begin? several months ago    What are your most bothersome symptoms? Pain    How would you characterize your symptom?  aching    What makes your symptoms better? Rest    What makes your symptoms worse? Movement     Have you been previously seen for this problem? No    Medical History:    Any recent changes to your medical history? No    Any new medication prescribed since last visit? No    Have you had surgery on this body part before? No    Social History:    Occupation: retired    Handedness: Right    Exercise: none    Review of Systems:    Do you have fever, chills, weight loss? No    Do you have any vision problems? Wears glasses    Do you have any chest pain or edema? No    Do you have any shortness of breath or wheezing?  No    Do you have stomach problems? No    Do you have any numbness or focal weakness? No    Do you have diabetes? No    Do you have problems with bleeding or clotting? No    Do you have an rashes or other skin lesions? No           CHIEF COMPLAINT:  Pain of the Right Ankle and Ankle Pain (right ankle pain x weeks)       HISTORY OF PRESENT ILLNESS  Mr. Platt is a pleasant 73 year old year old male who presents to clinic today with pain of his right ankle over the  past two weeks.  Duran explains that pain began without acute inciting event about two weeks ago. Pain is laterally about the ankle.  He states the pain is aching in nature.  It is worse with ambulation and negotiating stairs.  Improved with rest.  Treatment to date is included only rest.  Denies numbness and tingling lower extremity.  No history of ankle sprains.  He does note recent history of Achilles tendinitis.     At baseline he does have mild swelling of bilateral ankles.      Additional history: as documented    MEDICAL HISTORY  Patient Active Problem List   Diagnosis     Ingrowing nail       Current Outpatient Medications   Medication Sig Dispense Refill     aspirin (ASPIRIN CHILDRENS) 81 MG chewable tablet Take 81 mg by mouth daily.       atorvastatin (LIPITOR) 20 MG tablet Take 20 mg by mouth       Cholecalciferol (VITAMIN D-3 PO) Take  by mouth.       naproxen (NAPROSYN) 500 MG tablet Take 1 tablet (500 mg) by mouth 2 times daily (with meals) 20 tablet 0     oxybutynin (DITROPAN-XL) 10 MG 24 hr tablet Take 10 mg by mouth       PEG 3350-KCl-NaBcb-NaCl-NaSulf (PEG 3350/ELECTROLYTES) 240 G SOLR        Senna 176 MG/5ML SYRP Take 15 mLs by mouth       tamsulosin (FLOMAX) 0.4 MG capsule          Allergies   Allergen Reactions     Tetracycline      Pt is very sensitive to sun with this med.     Tetracyclines Photosensitivity     Omeprazole Rash     Omeprazole Magnesium Rash       No family history on file.    Additional medical/Social/Surgical histories reviewed in [facility] and updated as appropriate.     REVIEW OF SYSTEMS (4/30/2019)  CONSTITUTIONAL: Denies fever and weight loss  EYES: Denies acute vision changes  ENT: Denies hearing changes or difficulty swallowing  CARDIAC: Denies chest pain or edema  RESPIRATORY: Denies dyspnea, cough or wheeze  GASTROINTESTINAL: Denies abdominal pain, nausea, vomiting  MUSCULOSKELETAL: See HPI  SKIN: Denies any recent rash or lesion  NEUROLOGICAL: Denies numbness or focal  "weakness  PSYCHIATRIC: No  ENDOCRINE: Diagnosis of diabetes:No  HEMATOLOGY: Denies episodes of easy bleeding      PHYSICAL EXAM  Ht 1.6 m (5' 3\")   Wt 70.3 kg (155 lb)   BMI 27.46 kg/m       General  - normal appearance, in no obvious distress  CV  - normal pulses at posterior tib and dorsalis pedis  Pulm  - normal respiratory pattern, non-labored  Musculoskeletal - Right ankle  - stance: normal gait without limp  - inspection: Mild soft tissue swelling at the lateral foot ankle junction.,  normal bone and joint alignment, no obvious deformity  - palpation: Tenderness to palpation along the peroneal tendons at the region of lateral malleolus and slightly distal along the peroneal tendon.  Nontender at the base of fifth metatarsal, nontender at, malleoli and tarsal bones non-tender  - ROM: normal dorsiflexion, plantar flexion, inversion, eversion, not painful  - strength: 5/5 in all planes, painful resisted eversion.  Painful resisted inversion.  - special tests:  (-) anterior drawer  (-) talar tilt  (-) Tinel's  (-) squeeze test  Neuro  - no sensory or motor deficit, grossly normal coordination, normal muscle tone  Skin  - no ecchymosis, erythema, warmth, or induration, no obvious rash  Psych  - interactive, appropriate, normal mood and affect    IMAGING : X-ray ankle 3 view right. Final results and radiologist's interpretation, available in the [facility] health record. Images were reviewed with the patient/family members in the office today. My personal interpretation of the performed imaging is no acute osseous ab normalities or significant degenerative change.  Mortise intact.  No significant anterior posterior talar spurring.     ASSESSMENT & PLAN  Mr. Platt is a 73 year old year old male who presents to clinic today with insidious onset of right ankle pain over the past 2 weeks.  Clinical examination is concerning for peroneal tendinopathy.  This point we discussed means for anti-inflammation, use of an ankle " brace for support of these tendinous structures.  He will also follow a home exercise program if he does not achieve relief over the next 3 to 4 weeks, he should follow-up.    Diagnosis: Acute pain of right ankle, peroneal tendinitis of right ankle.    It was a pleasure seeing Duran today.    Dwight Mcclure DO, [facility]  Primary Care Sports Medicine

## 2019-04-30 NOTE — PROGRESS NOTES
SPORTS & ORTHOPEDIC WALK-IN VISIT 4/30/2019    Primary Care Physician: Dr. rodríguez    Right ankle pain x 2 weeks. No injury known. Has been dealing with achilles pain x several months, so not sure what's going on now. Ankle pain has been on and off    Reason for visit:     What part of your body is injured / painful?  right ankle    What caused the injury /pain? No inciting event     How long ago did your injury occur or pain begin? several months ago    What are your most bothersome symptoms? Pain    How would you characterize your symptom?  aching    What makes your symptoms better? Rest    What makes your symptoms worse? Movement     Have you been previously seen for this problem? No    Medical History:    Any recent changes to your medical history? No    Any new medication prescribed since last visit? No    Have you had surgery on this body part before? No    Social History:    Occupation: retired    Handedness: Right    Exercise: none    Review of Systems:    Do you have fever, chills, weight loss? No    Do you have any vision problems? Wears glasses    Do you have any chest pain or edema? No    Do you have any shortness of breath or wheezing?  No    Do you have stomach problems? No    Do you have any numbness or focal weakness? No    Do you have diabetes? No    Do you have problems with bleeding or clotting? No    Do you have an rashes or other skin lesions? No

## 2019-04-30 NOTE — PATIENT INSTRUCTIONS
PERONEAL TENDONITIS  What is a peroneal tendon injury?   A peroneal tendon injury is a problem with the tendons and muscles on the outer side of your lower leg and foot. Tendons are strong bands of tissue that attach muscle to bone. The peroneal tendons help keep your foot and ankle stable when you walk.   Tendons can be injured suddenly or they may be slowly damaged over time. You can have tiny or partial tears in your tendon. If you have a complete tear of your tendon, it is called a rupture. Other tendon injuries may be called a strain, tendinosis, or tendonitis.  What is the cause?   Peroneal injuries can be caused by:  Overuse of the tendon from a sport or work activity that causes your foot and ankle to roll inward, like when you run on sloped surfaces or run in shoes that are getting worn out on the outside of the heel.   A sudden activity that forces your foot upward toward your shin, like landing on your feet after a fall, or rolling your ankle on a rock while running.   What are the symptoms?   You may hear a pop or a snap when the injury happens. You may have pain and swelling on the outer side of your lower leg or ankle.   How is it diagnosed?   Your healthcare provider will examine you and ask about your symptoms, activities, and medical history. You may have X-rays or other scans.  How is it treated?   While you are recovering from your injury, you will need to change your sport or activity to one that will not make your condition worse. For example, you may need to swim instead of run.   Your healthcare provider may recommend stretching and strengthening exercises to help you heal.  Use an elastic bandage or an ankle brace as directed by your provider. You may need to use crutches until you can walk without pain.   The pain often gets better within a few weeks with self-care, but some injuries may take several months or longer to heal. It s important to follow all of your healthcare provider s  instructions.  How can I take care of myself?   To help relieve swelling and pain:  Put an ice pack, gel pack, or package of frozen vegetables wrapped in a cloth, on the area every 3 to 4 hours for up to 20 minutes at a time.   Do ice massage. To do this, first freeze water in a Styrofoam cup, then peel the top of the cup away to expose the ice. Hold the bottom of the cup and rub the ice over your tendon for 5 to 10 minutes. Do this several times a day while you have pain.   Keep your ankle up on a pillow when you sit or lie down.   Take pain medicine, such as acetaminophen, ibuprofen, or other medicine as directed by your provider. Nonsteroidal anti-inflammatory medicines (NSAIDs), such as ibuprofen, may cause stomach bleeding and other problems. These risks increase with age. Read the label and take as directed. Unless recommended by your healthcare provider, do not take for more than 10 days.  Moist heat may help relax your muscles and make it easier to move your leg. Put moist heat on the injured area for 10 to 15 minutes at a time before you do warm-up and stretching exercises. Moist heat includes heat patches or moist heating pads that you can purchase at most drugsThe car easily beat, a wet washcloth or towel that has been heated in the dryer, or a hot shower. Don t use heat if you have swelling.   Follow your healthcare provider's instructions, including any exercises recommended by your provider. Ask your provider:  How and when you will hear your test results   How long it will take to recover   What activities you should avoid, including how much you can lift, and when you can return to your normal activities   How to take care of yourself at home   What symptoms or problems you should watch for and what to do if you have them  Make sure you know when you should come back for a checkup.  How can I help prevent a peroneal tendon injury?   Warm-up exercises and stretching before activities can help prevent injuries. For  example, do exercises that keep your ankles and leg muscles strong. If your leg or ankle hurts after exercise, putting ice on it may help keep it from getting injured.   Follow safety rules and use any protective equipment recommended for your work or sport. For example, wear high-top athletic shoes or a supportive ankle brace. When you run, choose level surfaces and avoid rocks or holes.  Developed by [employer].  Published by [employer].  Copyright  2014 [employer] and/or one of its subsidiaries. All rights reserved.                Peroneal Tendon Exercises  You may start these exercises when you can stand comfortably on your injured leg with your heel resting on the floor and your full weight evenly distributed on both legs.  Towel stretch: Sit on a hard surface with your injured leg stretched out in front of you. Loop a towel around your toes and the ball of your foot and pull the towel toward your body keeping your leg straight. Hold this position for 15 to 30 seconds and then relax. Repeat 3 times.  When you don't feel much of a stretch using the towel, you can start the following exercises.  Standing calf stretch: Stand facing a wall with your hands on the wall at about eye level. Keep your injured leg back with your heel on the floor. Keep the other leg forward with the knee bent. Turn your back foot slightly inward (as if you were pigeon-toed). Slowly lean into the wall until you feel a stretch in the back of your calf. Hold the stretch for 15 to 30 seconds. Return to the starting position. Repeat 3 times. Do this exercise several times each day.   Standing soleus stretch: Stand facing a wall with your hands on the wall at about chest height. Keep your injured leg back with your heel on the floor. Keep the other leg forward with the knee bent. Turn your back foot slightly inward (as if you were pigeon-toed). Bend your back knee slightly and gently lean into the wall until you feel a stretch in  the lower calf of your injured leg. Hold the stretch for 15 to 30 seconds. Return to the starting position. Repeat 3 times.   Achilles stretch: Stand with the ball of one foot on a stair. Reach for the step below with your heel until you feel a stretch in the arch of your foot. Hold this position for 15 to 30 seconds and then relax. Repeat 3 times.   Heel raise: Stand behind a chair or counter with both feet flat on the floor. Using the chair or counter as a support, rise up onto your toes and hold for 5 seconds. Then slowly lower yourself down without holding onto the support. (It's OK to keep holding onto the support if you need to.) When this exercise becomes less painful, try doing this exercise while you are standing on the injured leg only. Repeat 15 times. Do 2 sets of 15. Rest 30 seconds between sets.   Step-up: Stand with the foot of your injured leg on a support 3 to 5 inches (8 to 13 centimeters) high --like a small step or block of wood. Keep your other foot flat on the floor. Shift your weight onto the injured leg on the support. Straighten your injured leg as the other leg comes off the floor. Return to the starting position by bending your injured leg and slowly lowering your uninjured leg back to the floor. Do 2 sets of 15.   Resisted ankle eversion: Sit with both legs stretched out in front of you, with your feet about a shoulder's width apart. Tie a loop in one end of elastic tubing. Put the foot of your injured leg through the loop so that the tubing goes around the arch of that foot and wraps around the outside of the other foot. Hold onto the other end of the tubing with your hand to provide tension. Turn the foot of your injured leg up and out. Make sure you keep your other foot still so that it will allow the tubing to stretch as you move the foot of your injured leg. Return to the starting position. Do 2 sets of 15.   Balance and reach exercises: Stand next to a chair with your injured leg  farther from the chair. The chair will provide support if you need it. Stand on the foot of your injured leg and bend your knee slightly. Try to raise the arch of this foot while keeping your big toe on the floor. Keep your foot in this position.   With the hand that is farther away from the chair, reach forward in front of you by bending at the waist. Avoid bending your knee any more as you do this. Repeat this 15 times. To make the exercise more challenging, reach farther in front of you. Do 2 sets of 15.   While keeping your arch raised, reach the hand that is farther away from the chair across your body toward the chair. The farther you reach, the more challenging the exercise. Do 2 sets of 15.  If you have access to a wobble board, do the following exercises:  Wobble board exercises   Stand on a wobble board with your feet shoulder-width apart.   Rock the board forwards and backwards 30 times, then side to side 30 times. Hold on to a chair if you need support.   Rotate the wobble board around so that the edge of the board is in contact with the floor at all times. Do this 30 times in a clockwise and then a counterclockwise direction.   Balance on the wobble board for as long as you can without letting the edges touch the floor. Try to do this for 2 minutes without touching the floor.   Rotate the wobble board in clockwise and counterclockwise circles, but do not let the edge of the board touch the floor.   When you have mastered the wobble exercises standing on both legs, try repeating them while standing on just your injured leg. After you are able to do these exercises on one leg, try to do them with your eyes closed. Make sure you have something nearby to support you in case you lose your balance.  Developed by [employer].  Published by [employer].  Copyright  2014 [employer] and/or one of its subsidiaries. All rights reserved.

## 2019-09-30 ENCOUNTER — HEALTH MAINTENANCE LETTER (OUTPATIENT)
Age: 74
End: 2019-09-30

## 2019-12-15 ENCOUNTER — HEALTH MAINTENANCE LETTER (OUTPATIENT)
Age: 74
End: 2019-12-15

## 2021-01-15 ENCOUNTER — HEALTH MAINTENANCE LETTER (OUTPATIENT)
Age: 76
End: 2021-01-15

## 2021-10-24 ENCOUNTER — HEALTH MAINTENANCE LETTER (OUTPATIENT)
Age: 76
End: 2021-10-24

## 2022-02-13 ENCOUNTER — HEALTH MAINTENANCE LETTER (OUTPATIENT)
Age: 77
End: 2022-02-13

## 2022-10-15 ENCOUNTER — HEALTH MAINTENANCE LETTER (OUTPATIENT)
Age: 77
End: 2022-10-15

## 2022-12-14 ENCOUNTER — OFFICE VISIT (OUTPATIENT)
Dept: URGENT CARE | Facility: URGENT CARE | Age: 77
End: 2022-12-14
Payer: COMMERCIAL

## 2022-12-14 VITALS
OXYGEN SATURATION: 97 % | BODY MASS INDEX: 28.2 KG/M2 | WEIGHT: 159.2 LBS | TEMPERATURE: 97.4 F | RESPIRATION RATE: 17 BRPM | HEART RATE: 77 BPM | DIASTOLIC BLOOD PRESSURE: 77 MMHG | SYSTOLIC BLOOD PRESSURE: 128 MMHG

## 2022-12-14 DIAGNOSIS — K59.01 SLOW TRANSIT CONSTIPATION: Primary | ICD-10-CM

## 2022-12-14 PROCEDURE — 99203 OFFICE O/P NEW LOW 30 MIN: CPT | Performed by: PHYSICIAN ASSISTANT

## 2022-12-14 RX ORDER — POLYETHYLENE GLYCOL 3350 17 G/17G
1 POWDER, FOR SOLUTION ORAL DAILY
Qty: 34 G | Refills: 0 | Status: SHIPPED | OUTPATIENT
Start: 2022-12-14 | End: 2022-12-16

## 2022-12-15 NOTE — PROGRESS NOTES
Slow transit constipation  - glycerin (LAXATIVE) 1.2 g suppository; Place 1 suppository rectally daily as needed (for constipation)  - polyethylene glycol (MIRALAX) 17 GM/Dose powder; Take 17 g (1 capful) by mouth daily for 2 days    Patient does not seem to be exhibiting any red flag symptoms that would warrant a trip to the ER and possible enema.  And like him to try using an oral laxative such as MiraLAX as well as a suppository.  If this does not provide a bowel movement in the next 24 to 48 hours, the patient will be seen in the ER for further work-up.  I educated the patient on monitoring for red flag symptoms.    Valeriano Anaya PA-C  [facility]    Subjective   77 year old who presents to clinic today for the following health issues:    Constipation       HPI     Constipation  Onset/Duration: Have not had a BM in 5 days- Dulcolax with little relief.   Description:  Consistency of stool: hard and small   Progression of Symptoms: worsening  Accompanying signs and symptoms:    Abdominal pain: No   Rectal pain: No   Blood in stool: No   Nausea/Vomiting: No   Weight loss or gain: No  History:   Similar problems in past: YES  History of abdominal surgery: No  Chronic laxative use: YES  New medications: No  Precipitating or alleviating factors: None   Therapies tried and outcome: Dulcolax and senna    Review of Systems   Review of Systems   See HPI    Objective    Temp: 97.4  F (36.3  C) Temp src: Tympanic BP: 128/77 Pulse: 77   Resp: 17 SpO2: 97 %       Physical Exam   Physical Exam  Constitutional:       General: He is not in acute distress.     Appearance: Normal appearance. He is normal weight. He is not ill-appearing, toxic-appearing or diaphoretic.   HENT:      Head: Normocephalic and atraumatic.   Cardiovascular:      Rate and Rhythm: Normal rate.      Pulses: Normal pulses.   Pulmonary:      Effort: Pulmonary effort is normal. No respiratory distress.   Abdominal:      General: There is  no distension.      Palpations: There is no mass.      Tenderness: There is no abdominal tenderness. There is no right CVA tenderness, left CVA tenderness, guarding or rebound.      Hernia: No hernia is present.   Neurological:      General: No focal deficit present.      Mental Status: He is alert and oriented to person, place, and time. Mental status is at baseline.      Gait: Gait normal.   Psychiatric:         Mood and Affect: Mood normal.         Behavior: Behavior normal.         Thought Content: Thought content normal.         Judgment: Judgment normal.          No results found for this or any previous visit (from the past 24 hour(s)).

## 2023-03-01 ENCOUNTER — OFFICE VISIT (OUTPATIENT)
Dept: URGENT CARE | Facility: URGENT CARE | Age: 78
End: 2023-03-01
Payer: COMMERCIAL

## 2023-03-01 VITALS
HEART RATE: 81 BPM | BODY MASS INDEX: 27.46 KG/M2 | TEMPERATURE: 97.9 F | DIASTOLIC BLOOD PRESSURE: 68 MMHG | RESPIRATION RATE: 18 BRPM | SYSTOLIC BLOOD PRESSURE: 138 MMHG | OXYGEN SATURATION: 97 % | WEIGHT: 155 LBS

## 2023-03-01 DIAGNOSIS — R05.8 POST-VIRAL COUGH SYNDROME: Primary | ICD-10-CM

## 2023-03-01 DIAGNOSIS — R05.1 ACUTE COUGH: ICD-10-CM

## 2023-03-01 PROCEDURE — 99213 OFFICE O/P EST LOW 20 MIN: CPT | Performed by: FAMILY MEDICINE

## 2023-03-01 RX ORDER — BENZONATATE 200 MG/1
200 CAPSULE ORAL 3 TIMES DAILY PRN
Qty: 30 CAPSULE | Refills: 0 | Status: SHIPPED | OUTPATIENT
Start: 2023-03-01

## 2023-03-01 NOTE — PATIENT INSTRUCTIONS
Soothe a sore throat and cough  Gargle every 2 hours with 1/4 teaspoon of salt dissolved in 1/2 cup of warm water. Suck on throat lozenges and cough drops to moisten your throat.  Cough medicines are available but it is unclear how effective they actually are.  Honey tbs for cough suppressant   Take acetaminophen or an NSAID, such as ibuprofen to ease throat pain  Humidifier in room where you are sleeping.

## 2023-03-01 NOTE — PROGRESS NOTES
URGENT CARE VISIT:    ASSESSMENT AND PLAN:      ICD-10-CM    1. Post-viral cough syndrome  R05.8       2. Acute cough  R05.1 benzonatate (TESSALON) 200 MG capsule          Differentials include but not limited to COVID-19, Bronchitis-viral, Bronchospasm, Viral upper respiratory illness, post viral cough, etc. Patient is feeling improvement from other URI symptoms but has persistent cough and provider suspects this is most likely related to postviral cough syndrome.  Vital signs and physical examination is unremarkable today.  I have prescribed Tessalon Perles 3 times a day as needed for symptoms.  Conservative therapy including increase hydration, salt water gargles, honey, humidifier, and rest as needed.    Red flag symptoms for urgent evaluation via ED shared.      Follow up with primary care provider with any problems, questions or concerns or if symptoms worsen or fail to improve. Patient verbalized understanding and is agreeable to plan. The patient was discharged ambulatory and in stable condition.    SUBJECTIVE:   Duran Platt is a 77 year old male presenting for chief complaint of cough - productive.  Onset was 7 day(s) ago.   He denies the following symptoms: fever, chills, wheezing, shortness of breath, sore throat, facial pain/pressure, body aches, fatigue, nausea, vomiting and diarrhea  Course of illness is same.    Treatment measures tried include None   Predisposing factors include recent illness URI.        PMH:   Past Medical History:   Diagnosis Date     BPH (benign prostatic hyperplasia)      Allergies: Tetracycline, Tetracyclines, Omeprazole, and Omeprazole magnesium   Medications:   Current Outpatient Medications   Medication Sig Dispense Refill     benzonatate (TESSALON) 200 MG capsule Take 1 capsule (200 mg) by mouth 3 times daily as needed for cough 30 capsule 0     aspirin (ASA) 81 MG chewable tablet Take 81 mg by mouth daily. (Patient not taking: Reported on 12/14/2022)       atorvastatin  (LIPITOR) 20 MG tablet Take 20 mg by mouth (Patient not taking: Reported on 12/14/2022)       Cholecalciferol (VITAMIN D-3 PO) Take  by mouth.       naproxen (NAPROSYN) 500 MG tablet Take 1 tablet (500 mg) by mouth 2 times daily (with meals) (Patient not taking: Reported on 12/14/2022) 20 tablet 0     oxybutynin (DITROPAN-XL) 10 MG 24 hr tablet Take 10 mg by mouth (Patient not taking: Reported on 12/14/2022)       PEG 3350-KCl-NaBcb-NaCl-NaSulf (PEG 3350/ELECTROLYTES) 240 G SOLR  (Patient not taking: Reported on 12/14/2022)       Senna 176 MG/5ML SYRP Take 15 mLs by mouth       tamsulosin (FLOMAX) 0.4 MG capsule  (Patient not taking: Reported on 12/14/2022)       Social History:   Social History     Tobacco Use     Smoking status: Never     Smokeless tobacco: Never   Substance Use Topics     Alcohol use: No       ROS:  Review of systems negative except as stated above.    OBJECTIVE:  /68   Pulse 81   Temp 97.9  F (36.6  C) (Tympanic)   Resp 18   Wt 70.3 kg (155 lb)   SpO2 97%   BMI 27.46 kg/m      GENERAL APPEARANCE: healthy, alert and no distress  EYES: EOMI,  PERRL, conjunctiva clear  HENT: ear canals and TM's normal.  Nose and mouth without ulcers, erythema or lesions  NECK: supple, nontender, no lymphadenopathy  RESP: lungs clear to auscultation - no rales, rhonchi or wheezes  CV: regular rates and rhythm, normal S1 S2, no murmur noted  PSYCH: mentation appears normal and affect normal/bright    Labs:      No results found for any visits on 03/01/23.

## 2023-03-09 ENCOUNTER — OFFICE VISIT (OUTPATIENT)
Dept: URGENT CARE | Facility: URGENT CARE | Age: 78
End: 2023-03-09
Payer: COMMERCIAL

## 2023-03-09 VITALS
BODY MASS INDEX: 27.46 KG/M2 | RESPIRATION RATE: 16 BRPM | TEMPERATURE: 97.9 F | OXYGEN SATURATION: 100 % | WEIGHT: 155 LBS | DIASTOLIC BLOOD PRESSURE: 78 MMHG | HEART RATE: 76 BPM | SYSTOLIC BLOOD PRESSURE: 142 MMHG

## 2023-03-09 DIAGNOSIS — J20.8 ACUTE BACTERIAL BRONCHITIS: ICD-10-CM

## 2023-03-09 DIAGNOSIS — B96.89 ACUTE BACTERIAL BRONCHITIS: ICD-10-CM

## 2023-03-09 DIAGNOSIS — J32.9 BACTERIAL SINUSITIS: Primary | ICD-10-CM

## 2023-03-09 DIAGNOSIS — R03.0 ELEVATED BLOOD PRESSURE READING WITHOUT DIAGNOSIS OF HYPERTENSION: ICD-10-CM

## 2023-03-09 DIAGNOSIS — B96.89 BACTERIAL SINUSITIS: Primary | ICD-10-CM

## 2023-03-09 PROCEDURE — 99213 OFFICE O/P EST LOW 20 MIN: CPT | Performed by: PHYSICIAN ASSISTANT

## 2023-03-09 RX ORDER — BENZONATATE 200 MG/1
200 CAPSULE ORAL 3 TIMES DAILY PRN
Qty: 30 CAPSULE | Refills: 0 | Status: SHIPPED | OUTPATIENT
Start: 2023-03-09 | End: 2023-03-19

## 2023-03-09 NOTE — PROGRESS NOTES
Assessment & Plan     Bacterial sinusitis    The sinuses are air-filled spaces within the bones of the face. They connect to the inside of the nose. Sinusitis is an inflammation of the tissue that lines the sinuses. Sinusitis can occur during a cold. It can also happen due to allergies to pollens and other particles in the air. Sinusitis can cause symptoms of sinus congestion and a feeling of fullness. A sinus infection causes fever, headache, and facial pain. There is often green or yellow fluid draining from the nose or into the back of the throat (post-nasal drip). You have been given antibiotics to treat this condition.   Home care    Take the full course of antibiotics as instructed. Don't stop taking them, even when you feel better.    Drink plenty of water, hot tea, and other liquids as directed by the healthcare provider. This may help thin nasal mucus. It also may help your sinuses drain fluids.    Heat may help soothe painful areas of your face. Use a towel soaked in hot water. Or,  the shower and direct the warm spray onto your face. Using a vaporizer along with a menthol rub at night may also help soothe symptoms.     An expectorant with guaifenesin may help thin nasal mucus and help your sinuses drain fluids. Talk with your provider or pharmacists before taking an over-the-counter (OTC) medicine if you have any questions about it or its side effects..    - amoxicillin-clavulanate (AUGMENTIN) 875-125 MG tablet; Take 1 tablet by mouth 2 times daily for 10 days    Acute bacterial bronchitis    Bronchitis is an infection of the air passages (bronchial tubes) in your lungs. It often occurs when you have a cold. This illness is contagious during the first few days and is spread through the air by coughing and sneezing, or by direct contact (touching the sick person and then touching your own eyes, nose, or mouth).  Symptoms of bronchitis include cough with mucus (phlegm) and low-grade fever.  Bronchitis usually lasts 7 to 14 days. Mild cases can be treated with simple home remedies. More severe infection is treated with an antibiotic.    - amoxicillin-clavulanate (AUGMENTIN) 875-125 MG tablet; Take 1 tablet by mouth 2 times daily for 10 days  - benzonatate (TESSALON) 200 MG capsule; Take 1 capsule (200 mg) by mouth 3 times daily as needed for cough    Elevated blood pressure reading without diagnosis of hypertension    Patients blood pressure is slightly elevated  Follow up with PCP for this      Review of external notes as documented elsewhere in note       At today's visit with Duran Platt , we discussed results, diagnosis, medications and formulated a plan.  We also discussed red flags for immediate return to clinic/ER, as well as indications for follow up with PCP if not improved in 3 days. Patient understood and agreed to plan. Duran Platt was discharged with stable vitals and has no further questions.       No follow-ups on file.    Valeriano Longoria, [facility], PA-ARI  [facility]    Merlene Garzon is a 77 year old, presenting for the following health issues:  Cough (Pt has had a cough for 3 weeks. Was here a week ago for this )      HPI   Review of Systems   Constitutional, HEENT, cardiovascular, pulmonary, GI, , musculoskeletal, neuro, skin, endocrine and psych systems are negative, except as otherwise noted.      Objective    BP (!) 142/78   Pulse 76   Temp 97.9  F (36.6  C) (Tympanic)   Resp 16   Wt 70.3 kg (155 lb)   SpO2 100%   BMI 27.46 kg/m    Body mass index is 27.46 kg/m .  Physical Exam   GENERAL: healthy, alert and no distress  EYES: Eyes grossly normal to inspection, PERRL and conjunctivae and sclerae normal  HENT: normal cephalic/atraumatic, ear canals and TM's normal, nose and mouth without ulcers or lesions, nasal mucosa edematous  and rhinorrhea purulent  NECK: no adenopathy, no asymmetry, masses, or scars and thyroid normal to palpation  RESP:  lungs clear to auscultation - no rales, rhonchi or wheezes  CV: regular rate and rhythm, normal S1 S2, no S3 or S4, no murmur, click or rub, no peripheral edema and peripheral pulses strong  MS: no gross musculoskeletal defects noted, no edema  SKIN: no suspicious lesions or rashes  NEURO: Normal strength and tone, mentation intact and speech normal  PSYCH: mentation appears normal, affect normal/bright        No results found for any visits on 03/09/23.

## 2023-03-26 ENCOUNTER — HEALTH MAINTENANCE LETTER (OUTPATIENT)
Age: 78
End: 2023-03-26

## 2024-05-26 ENCOUNTER — HEALTH MAINTENANCE LETTER (OUTPATIENT)
Age: 79
End: 2024-05-26

## 2024-08-16 ENCOUNTER — HOSPITAL ENCOUNTER (EMERGENCY)
Facility: CLINIC | Age: 79
Discharge: HOME | End: 2024-08-16
Attending: EMERGENCY MEDICINE | Admitting: EMERGENCY MEDICINE
Payer: COMMERCIAL

## 2024-08-16 VITALS
DIASTOLIC BLOOD PRESSURE: 71 MMHG | HEART RATE: 65 BPM | TEMPERATURE: 98 F | RESPIRATION RATE: 16 BRPM | SYSTOLIC BLOOD PRESSURE: 155 MMHG | OXYGEN SATURATION: 99 %

## 2024-08-16 DIAGNOSIS — S61.412A SKIN TEAR OF LEFT HAND WITHOUT COMPLICATION, INITIAL ENCOUNTER: ICD-10-CM

## 2024-08-16 PROCEDURE — 99283 EMERGENCY DEPT VISIT LOW MDM: CPT | Performed by: EMERGENCY MEDICINE

## 2024-08-16 PROCEDURE — 99284 EMERGENCY DEPT VISIT MOD MDM: CPT | Performed by: EMERGENCY MEDICINE

## 2024-08-16 PROCEDURE — 250N000013 HC RX MED GY IP 250 OP 250 PS 637: Performed by: EMERGENCY MEDICINE

## 2024-08-16 RX ORDER — CEPHALEXIN 500 MG/1
500 CAPSULE ORAL ONCE
Status: COMPLETED | OUTPATIENT
Start: 2024-08-16 | End: 2024-08-16

## 2024-08-16 RX ADMIN — CEPHALEXIN 500 MG: 500 CAPSULE ORAL at 19:08

## 2024-08-16 ASSESSMENT — COLUMBIA-SUICIDE SEVERITY RATING SCALE - C-SSRS
1. IN THE PAST MONTH, HAVE YOU WISHED YOU WERE DEAD OR WISHED YOU COULD GO TO SLEEP AND NOT WAKE UP?: NO
6. HAVE YOU EVER DONE ANYTHING, STARTED TO DO ANYTHING, OR PREPARED TO DO ANYTHING TO END YOUR LIFE?: NO
2. HAVE YOU ACTUALLY HAD ANY THOUGHTS OF KILLING YOURSELF IN THE PAST MONTH?: NO

## 2024-08-16 ASSESSMENT — ACTIVITIES OF DAILY LIVING (ADL): ADLS_ACUITY_SCORE: 33

## 2024-08-16 NOTE — ED PROVIDER NOTES
[place] EMERGENCY DEPARTMENT ([facility])    8/16/24       ED PROVIDER NOTE   Triage B  History     Chief Complaint   Patient presents with    Laceration     The history is provided by the patient and medical records.     Duran Platt is a 79 year old male who presents to the emergency department with ongoing bleeding from a cut to his left hand.  He is in the process of moving and accidentally struck his hand against a plastic garbage bin last night.  He sustained a cut to his left hand that continues to have oozing bleeding at this time.  He now presents for evaluation.  He is not on any anticoagulation.  No history of diabetes.    Per [facility] records, last Tdap was in 2017.     Past Medical History  Past Medical History:   Diagnosis Date    BPH (benign prostatic hyperplasia)      Past Surgical History:   Procedure Laterality Date    APPENDECTOMY      GENITOURINARY SURGERY      prastate bladder neck surgery 1/25/12    ORTHOPEDIC SURGERY      r rotator cuff repair     cephALEXin (KEFLEX) 250 MG capsule  aspirin (ASA) 81 MG chewable tablet  atorvastatin (LIPITOR) 20 MG tablet  benzonatate (TESSALON) 200 MG capsule  Cholecalciferol (VITAMIN D-3 PO)  naproxen (NAPROSYN) 500 MG tablet  oxybutynin (DITROPAN-XL) 10 MG 24 hr tablet  PEG 3350-KCl-NaBcb-NaCl-NaSulf (PEG 3350/ELECTROLYTES) 240 G SOLR  Senna 176 MG/5ML SYRP  tamsulosin (FLOMAX) 0.4 MG capsule      Allergies   Allergen Reactions    Tetracycline      Pt is very sensitive to sun with this med.    Tetracyclines & Related Photosensitivity    Omeprazole Rash    Omeprazole Magnesium Rash     Family History  No family history on file.  Social History   Social History     Tobacco Use    Smoking status: Never    Smokeless tobacco: Never   Substance Use Topics    Alcohol use: No    Drug use: No      A medically appropriate review of systems was performed with pertinent positives and negatives noted in the HPI, and all other systems negative.    Physical  Exam   BP: (!) 155/71  Pulse: 65  Temp: 98  F (36.7  C)  Resp: 16  SpO2: 99 %    Physical Exam  Vitals and nursing note reviewed.   Constitutional:       General: He is not in acute distress.     Appearance: He is well-developed. He is not diaphoretic.   HENT:      Head: Normocephalic and atraumatic.      Nose: Nose normal.   Eyes:      General: No scleral icterus.     Conjunctiva/sclera: Conjunctivae normal.   Cardiovascular:      Rate and Rhythm: Normal rate.   Pulmonary:      Effort: Pulmonary effort is normal. No respiratory distress.      Breath sounds: No stridor.   Abdominal:      General: There is no distension.   Musculoskeletal:         General: No deformity. Normal range of motion.      Left hand: Laceration present. No swelling, deformity or bony tenderness. Normal range of motion. Normal capillary refill.        Hands:       Cervical back: Normal range of motion and neck supple.      Comments: Triangular skin flap with dusky pedicle on dorsum of left hand near first CMC. Trace oozing. No warmth or purulent drainage. Mild marginal erythema.    Skin:     General: Skin is warm and dry.      Findings: No rash.   Neurological:      Mental Status: He is alert and oriented to person, place, and time.   Psychiatric:         Behavior: Behavior normal.         Thought Content: Thought content normal.           ED Course, Procedures, & Data      [facility]    -Laceration Repair    Date/Time: 8/16/2024 6:59 PM    Performed by: Katerina Fong MD  Authorized by: Katerina Fong MD    Risks, benefits and alternatives discussed.      ANESTHESIA (see MAR for exact dosages):     Anesthesia method:  None  LACERATION DETAILS     Location:  Hand    Hand location:  L hand, dorsum    Length (cm):  2.5    Depth (mm):  2    REPAIR TYPE:     Repair type:  Simple    EXPLORATION:     Hemostasis achieved with:  Direct pressure    Wound exploration: wound explored through full range of  motion and entire depth of wound probed and visualized      Wound extent: fascia not violated, no foreign body, no signs of injury, no tendon damage, no underlying fracture and no vascular damage      TREATMENT:     Area cleansed with:  Saline    Amount of cleaning:  Standard    Irrigation solution:  Sterile saline    Irrigation method:  Syringe    SKIN REPAIR     Repair method:  Steri-Strips    APPROXIMATION     Approximation:  Close    POST-PROCEDURE DETAILS     Dressing:  Non-adherent dressing (ace wrap)      PROCEDURE    Patient Tolerance:  Patient tolerated the procedure well with no immediate complications                  No results found for any visits on 08/16/24.  Medications   cephALEXin (KEFLEX) capsule 500 mg (500 mg Oral $Given 8/16/24 3039)     Labs Ordered and Resulted from Time of ED Arrival to Time of ED Departure - No data to display  No orders to display          Critical care was not performed.     Medical Decision Making  The patient's presentation was of moderate complexity (an acute complicated injury).    The patient's evaluation involved:  history and exam without other MDM data elements    The patient's management necessitated moderate risk (prescription drug management including medications given in the ED).    Assessment & Plan    Duran Platt is a 79 year old male who presents to the emergency department with ongoing bleeding from a cut to his left hand.    Ddx: skin tear, wound contamination, wound ppx    Patient's TDAP UTD. Irrigated wound and gave keflex ppx since this is a delayed presentation. Steristrips applied to flap with good hemorrhage control. Loosely approximated so wound can drain. Discussed monitoring for signs of infection with higher risk due to delayed repair. Given keflex rx for ppx. Return precautions provided.         I have reviewed the nursing notes. I have reviewed the findings, diagnosis, plan and need for follow up with the patient.    Discharge Medication  List as of 8/16/2024  7:03 PM        START taking these medications    Details   cephALEXin (KEFLEX) 250 MG capsule Take 1 capsule (250 mg) by mouth 4 times daily for 3 days, Disp-12 capsule, R-0, Local Print             Final diagnoses:   Skin tear of left hand without complication, initial encounter       Katerina Fong MD  [facility] EMERGENCY DEPARTMENT  8/16/2024        Katerina Fong MD  08/19/24 6553

## 2024-08-16 NOTE — ED TRIAGE NOTES
Arrives ambulatory with left hand laceration. Per patient he hit his hand while moving last night since then it keeps oozing. Denies being on thinners.      Triage Assessment (Adult)       Row Name 08/16/24 9734          Triage Assessment    Airway WDL WDL        Respiratory WDL    Respiratory WDL WDL        Skin Circulation/Temperature WDL    Skin Circulation/Temperature WDL WDL        Cardiac WDL    Cardiac WDL WDL        Peripheral/Neurovascular WDL    Peripheral Neurovascular WDL WDL        Cognitive/Neuro/Behavioral WDL    Cognitive/Neuro/Behavioral WDL WDL

## 2024-08-19 ENCOUNTER — HOSPITAL ENCOUNTER (EMERGENCY)
Facility: CLINIC | Age: 79
Discharge: HOME | End: 2024-08-19
Attending: EMERGENCY MEDICINE | Admitting: EMERGENCY MEDICINE
Payer: COMMERCIAL

## 2024-08-19 VITALS
DIASTOLIC BLOOD PRESSURE: 71 MMHG | TEMPERATURE: 98.1 F | HEART RATE: 63 BPM | OXYGEN SATURATION: 98 % | RESPIRATION RATE: 16 BRPM | SYSTOLIC BLOOD PRESSURE: 129 MMHG

## 2024-08-19 DIAGNOSIS — R22.32 LOCALIZED SWELLING ON LEFT HAND: ICD-10-CM

## 2024-08-19 PROCEDURE — 99283 EMERGENCY DEPT VISIT LOW MDM: CPT | Performed by: EMERGENCY MEDICINE

## 2024-08-19 PROCEDURE — 99282 EMERGENCY DEPT VISIT SF MDM: CPT | Performed by: EMERGENCY MEDICINE

## 2024-08-19 ASSESSMENT — COLUMBIA-SUICIDE SEVERITY RATING SCALE - C-SSRS
6. HAVE YOU EVER DONE ANYTHING, STARTED TO DO ANYTHING, OR PREPARED TO DO ANYTHING TO END YOUR LIFE?: NO
2. HAVE YOU ACTUALLY HAD ANY THOUGHTS OF KILLING YOURSELF IN THE PAST MONTH?: NO
1. IN THE PAST MONTH, HAVE YOU WISHED YOU WERE DEAD OR WISHED YOU COULD GO TO SLEEP AND NOT WAKE UP?: NO

## 2024-08-19 NOTE — DISCHARGE INSTRUCTIONS
Instructions from your doctor today:  Emergency Department (ED) testing is focused on the potential causes of your symptoms that are the most dangerous possibilities, and cannot cover every possibility. Based on the evaluation, it was deemed sufficiently safe to discharge and continue management through the clinics. Thus, follow-up is very important to assess for improvement/worsening, potential further testing, and potential treatment adjustments. If you were given opioid pain medications or other medications that can make you drowsy while in the ED, you should not drive for at least several hours and not until you feel completely back to normal.     Please make an appointment to follow up with:  - Your Primary Care Provider in 3-6 days for wound recheck if any ongoing concerns on the healing.   - If you do not have a primary care provider, you can be seen in follow-up and establish care by calling any of the clinics below:     - [facility] (phone: [phone])     - Primary Care / [facility] (phone: [phone])   - Have your clinic provider review the results from today's visit with you again, including any potential follow-up or additional testing that may be needed based on the results. Occasionally, incidental findings are found on later review by radiologists that may need follow-up.     Return to the Emergency Department immediately if you have spreading redness, excessive warmth, or pus (signs of wound infection), or any other urgent health concerns.

## 2024-08-19 NOTE — ED TRIAGE NOTES
Pt was in Friday for a laceration on Left hand, and now swollen, no pain   Triage Assessment (Adult)       Row Name 08/19/24 0115          Triage Assessment    Airway WDL WDL        Respiratory WDL    Respiratory WDL WDL        Skin Circulation/Temperature WDL    Skin Circulation/Temperature WDL WDL        Cardiac WDL    Cardiac WDL WDL        Peripheral/Neurovascular WDL    Peripheral Neurovascular WDL WDL        Cognitive/Neuro/Behavioral WDL    Cognitive/Neuro/Behavioral WDL WDL        [place] Coma Scale    Best Eye Response 4-->(E4) spontaneous     Best Motor Response 6-->(M6) obeys commands     Best Verbal Response 5-->(V5) oriented     [place] Coma Scale Score 15

## 2024-08-19 NOTE — ED PROVIDER NOTES
History     Chief Complaint   Patient presents with    Wound Infection     Pt was in Friday for a laceration on Left hand, and now swollen, no pain     HPI  Duran Platt is a 79 year old male without pertinent PMH who presents to the ED with left hand swelling.  Patient reports that 2 and half days ago he sustained a cut on the hand.  He came to the emergency department and it was closed with strips.  Patient noted some swelling in the fingers of the hand distal to the cut today.  He has had the hand and wrist tightly wrapped with an Ace wrap.  Strips are all in place.  He notes a small area on the side of the cut that was not closed as tightly as the rest.  He has not yet picked up the prescribed cephalexin for infection prophylaxis.    Physical Exam   BP: 129/71  Pulse: 63  Temp: 98.1  F (36.7  C)  Resp: 16  SpO2: 98 %    Physical Exam  General: no acute distress. Appears stated age.   HENT: MMM, no oropharyngeal lesions  Eyes: PERRL, normal sclerae   Cardio: Regular rate. Regular rhythm. Extremities well perfused  Resp: Normal work of breathing, regular respiratory rate.   Skin: Left hand dorsal side: Initially tightly wrapped with Ace wrap and gauze.  Approximately 2.5 cm laceration with Steri-Strips in place that is scabbed over, no ongoing hemorrhage, no surrounding erythema, not overly warm, no discharge.  Mild distal swelling in the hand and fingers of the left hand.  Neuro: alert without signs of confusion. CN II-XII grossly intact. Grossly normal strength and sensation in all extremities.   Psych: normal affect, normal behavior      ED Course      Procedures              Labs Ordered and Resulted from Time of ED Arrival to Time of ED Departure - No data to display  No orders to display        Medical Decision Making  The patient's presentation was of low complexity (an acute and uncomplicated illness or injury).    The patient's evaluation involved:  Review of 1 internal note.    The patient's  management necessitated only low risk treatment.      Assessments & Plan   Patient presenting with left hand swelling in the context of left dorsal hand laceration 2.5 days ago and tight Ace wrap in place. Vitals in the ED unremarkable. Nursing notes reviewed.     Exam without evidence of infection, no dehiscence, Steri-Strips in place.  Patient does have mild swelling distal to the Ace wrap which was on quite tight.  Swelling likely secondary to the tight wrap.  Ace wrap was reapplied loosely.    After counseling on the diagnosis, work-up, and treatment plan, the patient was discharged to home.  Encouraged him to  the cephalexin that was prescribed from previous visit. The patient was advised to follow-up with primary care in few days if any ongoing concerns about the wound. The patient was advised to return to the ED if worsening symptoms, signs of infection, or any urgent health concerns.     Final diagnoses:   Localized swelling on left hand     New Prescriptions    No medications on file     --  Rashi Presley MD   Emergency Medicine   [facility] EMERGENCY DEPARTMENT  8/19/2024       Rashi Presley MD  08/19/24 0140

## 2024-09-29 NOTE — DISCHARGE INSTRUCTIONS
Please make an appointment to follow up with Your Primary Care Provider in 3-4 days as needed, for wound check.    Keep wound clean and dry. Do not submerge in water (bathtub, pool, sink) for the net 48 hours. Do not get laceration wet for the next 24 hours. Do not apply bacitracin or Vaseline ointment to wound as it will cause steri strips to fall off.     Check wound daily for signs of infection such as increased swelling, redness, drainage, pain, or odor.     Keep the ace wrap on to help compress wound and stop from re-bleeding.    Steri strips should fall off within a week to 10 days.    Take keflex to prevent infection.    Follow up with a primary doctor or urgent care (or in this emergency department) you have concerns for wound infection.    
No

## 2025-02-05 ENCOUNTER — OFFICE VISIT (OUTPATIENT)
Dept: URGENT CARE | Facility: URGENT CARE | Age: 80
End: 2025-02-05
Payer: COMMERCIAL

## 2025-02-05 VITALS
OXYGEN SATURATION: 98 % | WEIGHT: 168 LBS | BODY MASS INDEX: 28.68 KG/M2 | RESPIRATION RATE: 16 BRPM | SYSTOLIC BLOOD PRESSURE: 141 MMHG | HEART RATE: 72 BPM | HEIGHT: 64 IN | DIASTOLIC BLOOD PRESSURE: 75 MMHG | TEMPERATURE: 98.7 F

## 2025-02-05 DIAGNOSIS — M25.472 BILATERAL SWELLING OF FEET AND ANKLES: ICD-10-CM

## 2025-02-05 DIAGNOSIS — I83.893 VARICOSE VEINS OF BILATERAL LOWER EXTREMITIES WITH OTHER COMPLICATIONS: ICD-10-CM

## 2025-02-05 DIAGNOSIS — M25.475 BILATERAL SWELLING OF FEET AND ANKLES: ICD-10-CM

## 2025-02-05 DIAGNOSIS — M25.471 BILATERAL SWELLING OF FEET AND ANKLES: ICD-10-CM

## 2025-02-05 DIAGNOSIS — R22.41 LOCALIZED SWELLING OF RIGHT LOWER EXTREMITY: Primary | ICD-10-CM

## 2025-02-05 DIAGNOSIS — M25.474 BILATERAL SWELLING OF FEET AND ANKLES: ICD-10-CM

## 2025-02-05 DIAGNOSIS — I87.2 VENOUS (PERIPHERAL) INSUFFICIENCY: ICD-10-CM

## 2025-02-05 PROCEDURE — 99214 OFFICE O/P EST MOD 30 MIN: CPT | Performed by: INTERNAL MEDICINE

## 2025-02-05 RX ORDER — FINASTERIDE 5 MG/1
5 TABLET, FILM COATED ORAL DAILY
COMMUNITY
Start: 2024-06-27

## 2025-02-05 ASSESSMENT — ENCOUNTER SYMPTOMS
SHORTNESS OF BREATH: 0
PALPITATIONS: 0

## 2025-02-05 NOTE — PATIENT INSTRUCTIONS
right leg bigger than left   Both ankles swollen    Referred to ER with concern for blood clot, high blood pressure related with water retention - sometimes heart related    Rash looks chronic vein issue    You have varicose veins - which potentially could just be causing issues  If tests normal, then may need compression socks, elevation and low salt.    [facility]

## 2025-02-05 NOTE — PROGRESS NOTES
ASSESSMENT AND PLAN:      ICD-10-CM    1. Localized swelling of right lower extremity  R22.41       2. Varicose veins of bilateral lower extremities with other complications  I83.893       3. Bilateral swelling of feet and ankles  M25.471     M25.474     M25.475     M25.472       4. Venous (peripheral) insufficiency  I87.2         Differential Diagnosis: DVT for right leg swelling.  Potentially from varicosities itself  Bilateral foot and ankle edema, concern for CHF/heart disease, hypertension  Potentially from varicosities    Patient Instructions       right leg bigger than left   Both ankles swollen    Referred to ER with concern for blood clot, high blood pressure related with water retention - sometimes heart related    Rash looks chronic vein issue    You have varicose veins - which potentially could just be causing issues  If tests normal, then may need compression socks, elevation and low salt.    [facility]           Sola Vickers MD  [facility]    Subjective     Duran Platt is a 79 year old who presents for Patient presents with:  Leg Problem: Couple weeks of Swelling in both legs, started having rash in both legs after swelling   Urgent Care    established patient of [facility].      Here for concerns of few weeks symptoms of swelling of both legs. - symptoms least a few weeks  He then started to notice a rash on his legs after the swelling occurred.    Showed rash to Dermatology few weeks ago - has had rash off & on  Told usually due to circulation  Call primary today & unable to get appointment   Referred to be seen in 4 hours    Has noticed blood pressure being higher than normal    Treatment measures tried include: nothing    Significant past medical history: Brief chart review notable for BPH.  He has his health care at [facility]        Review of Systems   Respiratory:  Negative for shortness of breath.    Cardiovascular:  Negative for chest pain and palpitations.  "    Current Outpatient Medications   Medication Sig Dispense Refill    Cholecalciferol (VITAMIN D-3 PO) Take  by mouth.      finasteride (PROSCAR) 5 MG tablet Take 5 mg by mouth daily.      tamsulosin (FLOMAX) 0.4 MG capsule       aspirin (ASA) 81 MG chewable tablet Take 81 mg by mouth daily (Patient not taking: Reported on 2/5/2025)      atorvastatin (LIPITOR) 20 MG tablet Take 20 mg by mouth (Patient not taking: Reported on 2/5/2025)      benzonatate (TESSALON) 200 MG capsule Take 1 capsule (200 mg) by mouth 3 times daily as needed for cough (Patient not taking: Reported on 2/5/2025) 30 capsule 0    naproxen (NAPROSYN) 500 MG tablet Take 1 tablet (500 mg) by mouth 2 times daily (with meals) (Patient not taking: Reported on 2/5/2025) 20 tablet 0    oxybutynin (DITROPAN-XL) 10 MG 24 hr tablet Take 10 mg by mouth (Patient not taking: Reported on 2/5/2025)      PEG 3350-KCl-NaBcb-NaCl-NaSulf (PEG 3350/ELECTROLYTES) 240 G SOLR  (Patient not taking: Reported on 2/5/2025)      Senna 176 MG/5ML SYRP Take 15 mLs by mouth (Patient not taking: Reported on 2/5/2025)               Objective    BP (!) 141/75   Pulse 72   Temp 98.7  F (37.1  C) (Temporal)   Resp 16   Ht 1.626 m (5' 4\")   Wt 76.2 kg (168 lb)   SpO2 98%   BMI 28.84 kg/m    Physical Exam  Vitals reviewed.   Constitutional:       Appearance: Normal appearance. He is not ill-appearing.   Cardiovascular:      Rate and Rhythm: Normal rate and regular rhythm.      Pulses: Normal pulses.      Heart sounds: Normal heart sounds.   Pulmonary:      Effort: Pulmonary effort is normal.      Breath sounds: Normal breath sounds. No rales.   Musculoskeletal:      Right lower leg: Edema present.      Left lower leg: Edema present.      Comments: Bilateral ankle and foot swelling.  Varicosities present both sides most prominent right side.  Right calf/lower leg is greater in size than left   Skin:     Findings: Rash (Small pinpoint red papules with brown macules consistent " with chronic venous stasis changes and changes from edema) present.   Neurological:      Mental Status: He is alert.

## 2025-03-27 ENCOUNTER — OFFICE VISIT (OUTPATIENT)
Dept: URGENT CARE | Facility: URGENT CARE | Age: 80
End: 2025-03-27
Payer: COMMERCIAL

## 2025-03-27 VITALS
SYSTOLIC BLOOD PRESSURE: 118 MMHG | TEMPERATURE: 98.3 F | HEART RATE: 66 BPM | DIASTOLIC BLOOD PRESSURE: 58 MMHG | WEIGHT: 162 LBS | OXYGEN SATURATION: 97 % | BODY MASS INDEX: 27.81 KG/M2 | RESPIRATION RATE: 12 BRPM

## 2025-03-27 DIAGNOSIS — J06.9 VIRAL UPPER RESPIRATORY TRACT INFECTION WITH COUGH: Primary | ICD-10-CM

## 2025-03-27 RX ORDER — BENZONATATE 100 MG/1
100 CAPSULE ORAL 3 TIMES DAILY PRN
Qty: 30 CAPSULE | Refills: 0 | Status: SHIPPED | OUTPATIENT
Start: 2025-03-27 | End: 2025-04-03

## 2025-03-27 ASSESSMENT — PAIN SCALES - GENERAL: PAINLEVEL_OUTOF10: NO PAIN (0)

## 2025-03-27 NOTE — PROGRESS NOTES
URGENT CARE VISIT:    SUBJECTIVE:   Duran Platt is a 79 year old male presenting with a chief complaint of stuffy nose and cough - productive.  Onset was 4 day(s) ago.   He denies the following symptoms: fever and shortness of breath  Course of illness is same.    Treatment measures tried include None tried with no relief of symptoms.  Predisposing factors include None.    PMH:   Past Medical History:   Diagnosis Date    BPH (benign prostatic hyperplasia)      Allergies: Tetracycline, Tetracyclines & related, Omeprazole, and Omeprazole magnesium   Medications:   Current Outpatient Medications   Medication Sig Dispense Refill    benzonatate (TESSALON) 100 MG capsule Take 1 capsule (100 mg) by mouth 3 times daily as needed for cough. 30 capsule 0    finasteride (PROSCAR) 5 MG tablet Take 5 mg by mouth daily.      tamsulosin (FLOMAX) 0.4 MG capsule       aspirin (ASA) 81 MG chewable tablet Take 81 mg by mouth daily (Patient not taking: Reported on 3/27/2025)      atorvastatin (LIPITOR) 20 MG tablet Take 20 mg by mouth (Patient not taking: Reported on 3/27/2025)      benzonatate (TESSALON) 200 MG capsule Take 1 capsule (200 mg) by mouth 3 times daily as needed for cough (Patient not taking: Reported on 3/27/2025) 30 capsule 0    Cholecalciferol (VITAMIN D-3 PO) Take  by mouth. (Patient not taking: Reported on 3/27/2025)      naproxen (NAPROSYN) 500 MG tablet Take 1 tablet (500 mg) by mouth 2 times daily (with meals) (Patient not taking: Reported on 3/9/2023) 20 tablet 0    oxybutynin (DITROPAN-XL) 10 MG 24 hr tablet Take 10 mg by mouth (Patient not taking: Reported on 3/27/2025)      PEG 3350-KCl-NaBcb-NaCl-NaSulf (PEG 3350/ELECTROLYTES) 240 G SOLR  (Patient not taking: Reported on 3/27/2025)      Senna 176 MG/5ML SYRP Take 15 mLs by mouth (Patient not taking: Reported on 3/27/2025)       Social History:   Social History     Tobacco Use    Smoking status: Never    Smokeless tobacco: Never   Substance Use Topics     Alcohol use: No       ROS:  Review of systems negative except as stated above.    OBJECTIVE:  /58   Pulse 66   Temp 98.3  F (36.8  C) (Oral)   Resp 12   Wt 73.5 kg (162 lb)   SpO2 97%   BMI 27.81 kg/m    GENERAL APPEARANCE: healthy, alert and no distress  EYES: EOMI,  PERRL, conjunctiva clear  HENT: ear canals and TM's normal.  Nose and mouth without ulcers, erythema or lesions  NECK: supple, nontender, no lymphadenopathy  RESP: lungs clear to auscultation - no rales, rhonchi or wheezes  CV: regular rates and rhythm, normal S1 S2, no murmur noted  SKIN: no suspicious lesions or rashes      ASSESSMENT:    ICD-10-CM    1. Viral upper respiratory tract infection with cough  J06.9 benzonatate (TESSALON) 100 MG capsule          PLAN:  Patient Instructions   Patient was educated on the natural course of condition. Take medications as directed. Side effects discussed. Conservative measures include rest, increased fluids, humidifier, and teaspoon of honey a few times daily. See your primary care provider if symptoms do not improve in 7 days. Seek emergency care if you develop shortness of breath or fever over 103.    Patient verbalized understanding and is agreeable to plan. The patient was discharged ambulatory and in stable condition.    Amy Harvey PA-C ....................  3/27/2025   4:02 PM

## 2025-03-31 ENCOUNTER — OFFICE VISIT (OUTPATIENT)
Dept: URGENT CARE | Facility: URGENT CARE | Age: 80
End: 2025-03-31
Payer: COMMERCIAL

## 2025-03-31 ENCOUNTER — ANCILLARY PROCEDURE (OUTPATIENT)
Dept: GENERAL RADIOLOGY | Facility: CLINIC | Age: 80
End: 2025-03-31
Attending: EMERGENCY MEDICINE
Payer: COMMERCIAL

## 2025-03-31 VITALS
TEMPERATURE: 97.4 F | HEART RATE: 69 BPM | SYSTOLIC BLOOD PRESSURE: 135 MMHG | WEIGHT: 161 LBS | BODY MASS INDEX: 27.64 KG/M2 | DIASTOLIC BLOOD PRESSURE: 70 MMHG | RESPIRATION RATE: 16 BRPM | OXYGEN SATURATION: 99 %

## 2025-03-31 DIAGNOSIS — J06.9 UPPER RESPIRATORY TRACT INFECTION, UNSPECIFIED TYPE: ICD-10-CM

## 2025-03-31 DIAGNOSIS — J40 BRONCHITIS WITH ACUTE WHEEZING: Primary | ICD-10-CM

## 2025-03-31 PROCEDURE — 71046 X-RAY EXAM CHEST 2 VIEWS: CPT | Mod: TC | Performed by: INTERNAL MEDICINE

## 2025-03-31 PROCEDURE — 3078F DIAST BP <80 MM HG: CPT | Performed by: EMERGENCY MEDICINE

## 2025-03-31 PROCEDURE — 99214 OFFICE O/P EST MOD 30 MIN: CPT | Performed by: EMERGENCY MEDICINE

## 2025-03-31 PROCEDURE — 3075F SYST BP GE 130 - 139MM HG: CPT | Performed by: EMERGENCY MEDICINE

## 2025-03-31 RX ORDER — PREDNISONE 20 MG/1
40 TABLET ORAL DAILY
Qty: 10 TABLET | Refills: 0 | Status: SHIPPED | OUTPATIENT
Start: 2025-03-31 | End: 2025-04-05

## 2025-03-31 RX ORDER — LISINOPRIL 10 MG/1
10 TABLET ORAL
COMMUNITY
Start: 2025-02-17

## 2025-03-31 NOTE — PROGRESS NOTES
Assessment & Plan     Diagnosis:    ICD-10-CM    1. Bronchitis with acute wheezing  J40 XR Chest 2 Views     predniSONE (DELTASONE) 20 MG tablet        Medical Decision Making:  Duran Platt is a 79 year old male who presents for evaluation of cough, wheezing, rhinorrhea.  This is consistent with an upper respiratory tract infection.  There is no signs at this point of serious bacterial infection such as OM, RPA, epiglottitis, PTA, strep pharyngitis.    I did a CXR to eval for pneumonia. This shows no acute infiltrate or effusion. There are no signs of complications of pneumonia at this point such as septic shock, bacteremia, empyema, hypoxia, respiratory failure or compromise. He does have some wheezing on exam and will treat for RAD with prednisone; has inhaler at home.     There are no gastrointestinal symptoms at this point and no signs of dehydration.  Close followup with PCP is indicated.  Go to ED for fever > 102 F, protracted vomiting, worsening shortness of breath, chest pain or other concerns.  Patient verbalized understanding and agreement with the plan was discharged in stable condition.      German Red PA-C  [facility]    Subjective     Duran Platt is a 79 year old male who presents to clinic today for the following health issues:  Chief Complaint   Patient presents with    Cough     Cough for 7-8 days. Coughing up greenish mucous       HPI  Patient states that for the last week has had a cough, congestion, coughing up some green mucus over the last few days.  He also notes that he is wheezing a little bit, and has inhaler at home and seems to be doing okay with this.  Denies any chest pain, shortness of breath at rest or with minimal exertion, fevers, ear pain, sinus pain or pressure, nausea, vomiting, diarrhea.  No recent steroid burst or hospitalization.    Review of Systems    See HPI    Objective      Vitals: /70 (BP Location: Left arm, Patient Position:  Sitting, Cuff Size: Adult Regular)   Pulse 69   Temp 97.4  F (36.3  C) (Tympanic)   Resp 16   Wt 73 kg (161 lb)   SpO2 99%   BMI 27.64 kg/m      Patient Vitals for the past 24 hrs:   BP Temp Temp src Pulse Resp SpO2 Weight   03/31/25 1546 135/70 97.4  F (36.3  C) Tympanic 69 16 99 % 73 kg (161 lb)       Vital signs reviewed by: German Red PA-C    Physical Exam   Constitutional: Patient is alert and cooperative. No acute distress.  Ears: Right TM is normal. Left TM is normal. External ear canals are normal.  Mouth: Mucous membranes are moist. Normal tongue and tonsil. Posterior oropharynx is clear.  Cardiovascular: Regular rate and rhythm  Pulmonary/Chest: Expiratory wheezes in the upper lung fields bilaterally.  Lower lung fields are clear.  No rales or rhonchi.  Speaking in full sentences, no respiratory distress.  Neurological: Alert and oriented x3.   Skin: No rash noted on visualized skin.  Psychiatric:The patient has a normal mood and affect.     Labs/Imaging:  Results for orders placed or performed in visit on 03/31/25   XR Chest 2 Views     Status: None    Narrative    EXAM: XR CHEST 2 VIEWS  LOCATION: [facility]  DATE: 3/31/2025    INDICATION:  Upper respiratory tract infection, unspecified type  COMPARISON: None.      Impression    IMPRESSION: Negative chest.     Reading per radiology      German Red PA-C, March 31, 2025

## 2025-06-14 ENCOUNTER — HEALTH MAINTENANCE LETTER (OUTPATIENT)
Age: 80
End: 2025-06-14